# Patient Record
Sex: MALE | Race: WHITE | NOT HISPANIC OR LATINO | Employment: OTHER | URBAN - METROPOLITAN AREA
[De-identification: names, ages, dates, MRNs, and addresses within clinical notes are randomized per-mention and may not be internally consistent; named-entity substitution may affect disease eponyms.]

---

## 2017-03-29 ENCOUNTER — OFFICE VISIT (OUTPATIENT)
Dept: FAMILY MEDICINE | Facility: CLINIC | Age: 72
End: 2017-03-29
Payer: MEDICARE

## 2017-03-29 ENCOUNTER — RADIANT APPOINTMENT (OUTPATIENT)
Dept: GENERAL RADIOLOGY | Facility: CLINIC | Age: 72
End: 2017-03-29
Attending: FAMILY MEDICINE
Payer: MEDICARE

## 2017-03-29 VITALS
HEART RATE: 66 BPM | TEMPERATURE: 98.6 F | BODY MASS INDEX: 35.35 KG/M2 | WEIGHT: 261 LBS | DIASTOLIC BLOOD PRESSURE: 79 MMHG | HEIGHT: 72 IN | SYSTOLIC BLOOD PRESSURE: 135 MMHG | OXYGEN SATURATION: 96 %

## 2017-03-29 DIAGNOSIS — S86.911A STRAIN OF RIGHT KNEE, INITIAL ENCOUNTER: Primary | ICD-10-CM

## 2017-03-29 DIAGNOSIS — S86.911A STRAIN OF RIGHT KNEE, INITIAL ENCOUNTER: ICD-10-CM

## 2017-03-29 PROCEDURE — 99213 OFFICE O/P EST LOW 20 MIN: CPT | Performed by: FAMILY MEDICINE

## 2017-03-29 PROCEDURE — 73560 X-RAY EXAM OF KNEE 1 OR 2: CPT | Mod: RT

## 2017-03-29 NOTE — MR AVS SNAPSHOT
After Visit Summary   3/29/2017    Mamadou Pena    MRN: [id number]           Patient Information     Date Of Birth          1945        Visit Information        Provider Department      3/29/2017 10:45 AM Chay Rudd MD [facility]        Today's Diagnoses     Strain of right knee, initial encounter    -  1       Follow-ups after your visit        Who to contact     If you have questions or need follow up information about today's clinic visit or your schedule please contact [facility] directly at [phone].  Normal or non-critical lab and imaging results will be communicated to you by MyChart, letter or phone within 4 business days after the clinic has received the results. If you do not hear from us within 7 days, please contact the clinic through Ad Summost or phone. If you have a critical or abnormal lab result, we will notify you by phone as soon as possible.  Submit refill requests through AeternusLED or call your pharmacy and they will forward the refill request to us. Please allow 3 business days for your refill to be completed.          Additional Information About Your Visit        MyChart Information     AeternusLED gives you secure access to your electronic health record. If you see a primary care provider, you can also send messages to your care team and make appointments. If you have questions, please call your primary care clinic.  If you do not have a primary care provider, please call [phone] and they will assist you.        Care EveryWhere ID     This is your Care EveryWhere ID. This could be used by other organizations to access your [facility] medical records  [id number]        Your Vitals Were     Pulse Temperature Height Pulse Oximetry BMI (Body Mass Index)       66 98.6  F (37  C) (Oral) 6' (1.829 m) 96% 35.4 kg/m2        Blood Pressure from Last 3 Encounters:   03/29/17 135/79   11/22/16 128/72   01/20/16 138/70    Weight from Last 3  Encounters:   03/29/17 261 lb (118.4 kg)   11/22/16 253 lb 12.8 oz (115.1 kg)   01/20/16 262 lb (118.8 kg)               Primary Care Provider Office Phone # Fax #    Chay Rudd [phone]       [facility] [address] [address] [facility] [address]        Thank you!     Thank you for choosing [facility]  for your care. Our goal is always to provide you with excellent care. Hearing back from our patients is one way we can continue to improve our services. Please take a few minutes to complete the written survey that you may receive in the mail after your visit with us. Thank you!             Your Updated Medication List - Protect others around you: Learn how to safely use, store and throw away your medicines at www.disposemymeds.org.          This list is accurate as of: 3/29/17  1:03 PM.  Always use your most recent med list.                   Brand Name Dispense Instructions for use    acyclovir 400 MG tablet    ZOVIRAX    90 tablet    Take 1 tablet (400 mg) by mouth daily       amLODIPine 10 MG tablet    NORVASC    90 tablet    Take 1 tablet (10 mg) by mouth daily       aspirin 325 MG EC tablet      1 TABLET DAILY       atenolol 50 MG tablet    TENORMIN    90 tablet    Take 1 tablet (50 mg) by mouth daily       CENTRUM SILVER per tablet      Take 1 tablet by mouth daily       diclofenac 1 % Gel topical gel    VOLTAREN    100 g    Apply 4 grams to knees or 2 grams to hands four times daily using enclosed dosing card.       fluticasone 50 MCG/ACT spray    FLONASE    16 g    Spray 1-2 sprays into both nostrils daily       levothyroxine 150 MCG tablet    LEVOXYL    90 tablet    Take 1 tablet (150 mcg) by mouth daily       omeprazole 20 MG CR capsule    priLOSEC    90 capsule    Take 1 capsule (20 mg) by mouth daily       * order for DME     1 Units    Needs CPAP supplies  He continue to use and benefit from the CPAP 780.57C Sleep apnea       * order for DME     1  Units    Equipment being ordered: CPAP mask and supplies       order for DME     1 Units    Equipment being ordered: CPAP mask and supplies       * Notice:  This list has 2 medication(s) that are the same as other medications prescribed for you. Read the directions carefully, and ask your doctor or other care provider to review them with you.

## 2017-03-29 NOTE — NURSING NOTE
Chief Complaint   Patient presents with     Knee Pain     fell on R knee and hurt it a few days ago. Stiffness, and some pain. Hard time going up and down stairs. Took left over Oxycodone to help sleep at night. Voltaren did not help. Heat and soaking in hot tub once daily       Initial /79  Pulse 66  Temp 98.6  F (37  C) (Oral)  Ht 6' (1.829 m)  Wt 261 lb (118.4 kg)  SpO2 96%  BMI 35.4 kg/m2 Estimated body mass index is 35.4 kg/(m^2) as calculated from the following:    Height as of this encounter: 6' (1.829 m).    Weight as of this encounter: 261 lb (118.4 kg).  Medication Reconciliation: complete    Princess Valiente MA

## 2017-03-29 NOTE — PROGRESS NOTES
SUBJECTIVE:  Mamadou Pena is a 71 year old male who presents to the clinic today for right knee pain.  Onset of symptoms: 9 months ago.  History of injury: fell nine months ago and hurt one month but reinjured the knee with a twisting motion.  Associated symptoms: giving out  Location of pain: anterior  Symptoms are: Improving  :    Medications and therapies tried: rest helped  What makes it worse: going down stairs    Past Medical, social, family histories, medications, and allergies reviewed and updated    OBJECTIVE:  Blood pressure 135/79, pulse 66, temperature 98.6  F (37  C), temperature source Oral, height 6' (1.829 m), weight 261 lb (118.4 kg), SpO2 96 %.  Patient appears to be in alert and in no apparent distress distress  KNEE EXAM (right)  Effusion: yes  Erythema: no  Patellar tenderness: no  Medial joint line tenderness: yes  Lateral joint line tenderness: no  Lachman's test: negative  Bret's maneuver: positive  Valgus:negative  Varus:negative  range of motion: full  Calves soft non-tender.  Neurovascularly Intact Distally.      ICD-10-CM    1. Strain of right knee, initial encounter S86.911A XR Knee Right 1/2 Views    PLAN:consider ortho referral if not continuing to iprove over the next month

## 2017-08-05 DIAGNOSIS — I10 HYPERTENSION GOAL BP (BLOOD PRESSURE) < 140/90: ICD-10-CM

## 2017-08-07 RX ORDER — AMLODIPINE BESYLATE 10 MG/1
TABLET ORAL
Qty: 90 TABLET | Refills: 0 | Status: SHIPPED | OUTPATIENT
Start: 2017-08-07 | End: 2017-11-13

## 2017-11-13 ENCOUNTER — TELEPHONE (OUTPATIENT)
Dept: FAMILY MEDICINE | Facility: CLINIC | Age: 72
End: 2017-11-13

## 2017-11-13 DIAGNOSIS — I10 HYPERTENSION GOAL BP (BLOOD PRESSURE) < 140/90: ICD-10-CM

## 2017-11-13 DIAGNOSIS — E78.00 HIGH CHOLESTEROL: ICD-10-CM

## 2017-11-13 DIAGNOSIS — E03.9 HYPOTHYROIDISM, UNSPECIFIED TYPE: Primary | ICD-10-CM

## 2017-11-14 ENCOUNTER — DOCUMENTATION ONLY (OUTPATIENT)
Dept: LAB | Facility: CLINIC | Age: 72
End: 2017-11-14

## 2017-11-14 DIAGNOSIS — E03.9 HYPOTHYROIDISM, UNSPECIFIED TYPE: ICD-10-CM

## 2017-11-14 DIAGNOSIS — Z13.6 CARDIOVASCULAR SCREENING; LDL GOAL LESS THAN 130: Primary | ICD-10-CM

## 2017-11-14 DIAGNOSIS — I10 HYPERTENSION GOAL BP (BLOOD PRESSURE) < 140/90: ICD-10-CM

## 2017-11-14 RX ORDER — AMLODIPINE BESYLATE 10 MG/1
TABLET ORAL
Qty: 30 TABLET | Refills: 0 | Status: SHIPPED | OUTPATIENT
Start: 2017-11-14 | End: 2017-12-14

## 2017-11-14 NOTE — TELEPHONE ENCOUNTER
Patient has PVL and appointment scheduled with Dr. Chay Rudd for end of month.   Please sign lab orders.     :;  Please pend previsit lab orders and send to Dr. Chay Rudd for signature.  Lab appointment scheduled for 11/16.  Adriana Hightower RN    Health Maintenance Due   Topic Date Due     MEDICARE ANNUAL WELLNESS VISIT  12/02/1963     PNEUMOCOCCAL (1 of 2 - PCV13) 12/02/2010     AORTIC ANEURYSM SCREENING (SYSTEM ASSIGNED)  12/02/2010     ADVANCE DIRECTIVE PLANNING Q5 YRS  09/08/2016     TSH Q1 YEAR  01/15/2017     INFLUENZA VACCINE (SYSTEM ASSIGNED)  09/01/2017     FALL RISK ASSESSMENT  11/22/2017

## 2017-11-14 NOTE — PROGRESS NOTES
Need previsit labs-See pending orders and close encounter./Brenda Carranza,         Physical 11/27/17

## 2017-11-16 DIAGNOSIS — E78.00 HIGH CHOLESTEROL: ICD-10-CM

## 2017-11-16 DIAGNOSIS — E03.9 HYPOTHYROIDISM, UNSPECIFIED TYPE: ICD-10-CM

## 2017-11-16 DIAGNOSIS — I10 HYPERTENSION GOAL BP (BLOOD PRESSURE) < 140/90: ICD-10-CM

## 2017-11-16 LAB
ANION GAP SERPL CALCULATED.3IONS-SCNC: 6 MMOL/L (ref 3–14)
BUN SERPL-MCNC: 18 MG/DL (ref 7–30)
CALCIUM SERPL-MCNC: 9.1 MG/DL (ref 8.5–10.1)
CHLORIDE SERPL-SCNC: 105 MMOL/L (ref 94–109)
CHOLEST SERPL-MCNC: 210 MG/DL
CO2 SERPL-SCNC: 29 MMOL/L (ref 20–32)
CREAT SERPL-MCNC: 1.18 MG/DL (ref 0.66–1.25)
GFR SERPL CREATININE-BSD FRML MDRD: 61 ML/MIN/1.7M2
GLUCOSE SERPL-MCNC: 110 MG/DL (ref 70–99)
HDLC SERPL-MCNC: 34 MG/DL
LDLC SERPL CALC-MCNC: 123 MG/DL
NONHDLC SERPL-MCNC: 176 MG/DL
POTASSIUM SERPL-SCNC: 4.7 MMOL/L (ref 3.4–5.3)
SODIUM SERPL-SCNC: 140 MMOL/L (ref 133–144)
TRIGL SERPL-MCNC: 266 MG/DL
TSH SERPL DL<=0.005 MIU/L-ACNC: 2.22 MU/L (ref 0.4–4)

## 2017-11-16 PROCEDURE — 84443 ASSAY THYROID STIM HORMONE: CPT | Performed by: FAMILY MEDICINE

## 2017-11-16 PROCEDURE — 80061 LIPID PANEL: CPT | Performed by: FAMILY MEDICINE

## 2017-11-16 PROCEDURE — 80048 BASIC METABOLIC PNL TOTAL CA: CPT | Performed by: FAMILY MEDICINE

## 2017-11-16 PROCEDURE — 36415 COLL VENOUS BLD VENIPUNCTURE: CPT | Performed by: FAMILY MEDICINE

## 2017-11-30 ENCOUNTER — TELEPHONE (OUTPATIENT)
Dept: FAMILY MEDICINE | Facility: CLINIC | Age: 72
End: 2017-11-30

## 2017-11-30 NOTE — TELEPHONE ENCOUNTER
Patient came in for their physical thinking that it was today 11/30/17 when it was actually 11/27/17. They said that the person who scheduled the appt told them 11/30. They would like to speak to pcp or team to see if he can get in sooner for appt. And to know if they will need to redo labs. We did schedule an appt for him on 12/14

## 2017-11-30 NOTE — TELEPHONE ENCOUNTER
Dr. Rudd has no openings today and is already double booked, he is out all next week as well. The soonest would be 12/14/17 which he is already scheduled.    LM for patient letting him know we are not able to fit him in, soonest would be 12/14/17.    Brenda Carranza,

## 2017-12-14 ENCOUNTER — OFFICE VISIT (OUTPATIENT)
Dept: FAMILY MEDICINE | Facility: CLINIC | Age: 72
End: 2017-12-14
Payer: MEDICARE

## 2017-12-14 VITALS
DIASTOLIC BLOOD PRESSURE: 84 MMHG | BODY MASS INDEX: 34.26 KG/M2 | HEART RATE: 67 BPM | OXYGEN SATURATION: 98 % | SYSTOLIC BLOOD PRESSURE: 149 MMHG | TEMPERATURE: 98.5 F | WEIGHT: 252.6 LBS

## 2017-12-14 DIAGNOSIS — R09.81 NASAL CONGESTION: ICD-10-CM

## 2017-12-14 DIAGNOSIS — Z23 NEED FOR VACCINATION: ICD-10-CM

## 2017-12-14 DIAGNOSIS — I10 ESSENTIAL HYPERTENSION: ICD-10-CM

## 2017-12-14 DIAGNOSIS — Z00.00 MEDICARE ANNUAL WELLNESS VISIT, SUBSEQUENT: Primary | ICD-10-CM

## 2017-12-14 DIAGNOSIS — M19.041 PRIMARY OSTEOARTHRITIS OF RIGHT HAND: ICD-10-CM

## 2017-12-14 DIAGNOSIS — K21.00 GASTROESOPHAGEAL REFLUX DISEASE WITH ESOPHAGITIS: ICD-10-CM

## 2017-12-14 DIAGNOSIS — B00.9 HERPES SIMPLEX TYPE 2 INFECTION: ICD-10-CM

## 2017-12-14 DIAGNOSIS — I10 HYPERTENSION GOAL BP (BLOOD PRESSURE) < 140/90: ICD-10-CM

## 2017-12-14 DIAGNOSIS — E03.9 HYPOTHYROIDISM, UNSPECIFIED TYPE: ICD-10-CM

## 2017-12-14 PROCEDURE — 90732 PPSV23 VACC 2 YRS+ SUBQ/IM: CPT | Performed by: FAMILY MEDICINE

## 2017-12-14 PROCEDURE — G0009 ADMIN PNEUMOCOCCAL VACCINE: HCPCS | Performed by: FAMILY MEDICINE

## 2017-12-14 PROCEDURE — G0439 PPPS, SUBSEQ VISIT: HCPCS | Performed by: FAMILY MEDICINE

## 2017-12-14 RX ORDER — LEVOTHYROXINE SODIUM 150 UG/1
150 TABLET ORAL DAILY
Qty: 90 TABLET | Refills: 3 | Status: SHIPPED | OUTPATIENT
Start: 2017-12-14 | End: 2018-06-20

## 2017-12-14 RX ORDER — AMLODIPINE BESYLATE 10 MG/1
TABLET ORAL
Qty: 90 TABLET | Refills: 1 | Status: SHIPPED | OUTPATIENT
Start: 2017-12-14 | End: 2018-06-07

## 2017-12-14 RX ORDER — FLUTICASONE PROPIONATE 50 MCG
1-2 SPRAY, SUSPENSION (ML) NASAL DAILY
Qty: 16 G | Refills: 5 | Status: CANCELLED | OUTPATIENT
Start: 2017-12-14

## 2017-12-14 RX ORDER — ATENOLOL 50 MG/1
50 TABLET ORAL DAILY
Qty: 90 TABLET | Refills: 1 | Status: SHIPPED | OUTPATIENT
Start: 2017-12-14 | End: 2018-06-07

## 2017-12-14 RX ORDER — ACYCLOVIR 400 MG/1
400 TABLET ORAL DAILY
Qty: 90 TABLET | Refills: 3 | Status: SHIPPED | OUTPATIENT
Start: 2017-12-14 | End: 2019-07-16

## 2017-12-14 ASSESSMENT — ACTIVITIES OF DAILY LIVING (ADL)
I_NEED_ASSISTANCE_FOR_THE_FOLLOWING_DAILY_ACTIVITIES:: NO ASSISTANCE IS NEEDED
CURRENT_FUNCTION: NO ASSISTANCE NEEDED

## 2017-12-14 NOTE — NURSING NOTE
Prior to injection verified patient identity using patient's name and date of birth.    Patient instructed to wait in clinic for 20 minutes following injection and to notify staff of any adverse reactions immediately.     Screening Questionnaire for Adult Immunization     Are you sick today?   No   Do you have allergies to medications, food or any vaccine?   No   Have you ever had a serious reaction after receiving a vaccination?   No   Do you have a long-term health problem with heart disease, lung disease,  asthma, kidney disease, diabetes, anemia, metabolic or blood disease?   No   Do you have cancer, leukemia, AIDS, or any immune system problem?   No   Do you take cortisone, prednisone, other steroids, or anticancer drugs, or  have you had any x-ray (radiation) treatments?   No   Have you had a seizure, brain, or other nervous system problem?   No   During the past year, have you received a transfusion of blood or blood       products, or been given a medicine called immune (gamma) globulin?   No   For women: Are you pregnant or is there a chance you could become         pregnant during the next month?   No   Have you received any vaccinations in the past 4 weeks?   No    Immunization questionnaire answers were all negative.      MNVFC doesn't apply on this patient  Sukumar Zavala, [facility]

## 2017-12-14 NOTE — MR AVS SNAPSHOT
After Visit Summary   12/14/2017    Mamadou Pena    MRN: [id number]           Patient Information     Date Of Birth          1945        Visit Information        Provider Department      12/14/2017 10:15 AM Chay Rudd MD [facility]        Today's Diagnoses     Medicare annual wellness visit, subsequent    -  1    Hypertension goal BP (blood pressure) < 140/90        Herpes simplex type 2 infection        Essential hypertension        Nasal congestion        Hypothyroidism, unspecified type        Gastroesophageal reflux disease with esophagitis        Primary osteoarthritis of right hand        Need for vaccination          Care Instructions      Preventive Health Recommendations:       Male Ages 65 and over    Yearly exam:             See your health care provider every year in order to  o   Review health changes.   o   Discuss preventive care.    o   Review your medicines if your doctor has prescribed any.    Talk with your health care provider about whether you should have a test to screen for prostate cancer (PSA).    Every 3 years, have a diabetes test (fasting glucose). If you are at risk for diabetes, you should have this test more often.    Every 5 years, have a cholesterol test. Have this test more often if you are at risk for high cholesterol or heart disease.     Every 10 years, have a colonoscopy. Or, have a yearly FIT test (stool test). These exams will check for colon cancer.    Talk to with your health care provider about screening for Abdominal Aortic Aneurysm if you have a family history of AAA or have a history of smoking.  Shots:     Get a flu shot each year.     Get a tetanus shot every 10 years.     Talk to your doctor about your pneumonia vaccines. There are now two you should receive - Pneumovax (PPSV 23) and Prevnar (PCV 13).    Talk to your doctor about a shingles vaccine.     Talk to your doctor about the hepatitis B vaccine.  Nutrition:      Eat at least 5 servings of fruits and vegetables each day.     Eat whole-grain bread, whole-wheat pasta and brown rice instead of white grains and rice.     Talk to your doctor about Calcium and Vitamin D.   Lifestyle    Exercise for at least 150 minutes a week (30 minutes a day, 5 days a week). This will help you control your weight and prevent disease.     Limit alcohol to one drink per day.     No smoking.     Wear sunscreen to prevent skin cancer.     See your dentist every six months for an exam and cleaning.     See your eye doctor every 1 to 2 years to screen for conditions such as glaucoma, macular degeneration and cataracts.          Follow-ups after your visit        Future tests that were ordered for you today     Open Future Orders        Priority Expected Expires Ordered    US abdominal aorta limited Routine  12/14/2018 12/14/2017            Who to contact     If you have questions or need follow up information about today's clinic visit or your schedule please contact [facility] directly at [phone].  Normal or non-critical lab and imaging results will be communicated to you by Ozmo Deviceshart, letter or phone within 4 business days after the clinic has received the results. If you do not hear from us within 7 days, please contact the clinic through PlaceFirstt or phone. If you have a critical or abnormal lab result, we will notify you by phone as soon as possible.  Submit refill requests through Metaresolver or call your pharmacy and they will forward the refill request to us. Please allow 3 business days for your refill to be completed.          Additional Information About Your Visit        Ozmo DevicesharPeople and Pages Information     Metaresolver gives you secure access to your electronic health record. If you see a primary care provider, you can also send messages to your care team and make appointments. If you have questions, please call your primary care clinic.  If you do not have a primary care provider, please  call [phone] and they will assist you.        Care EveryWhere ID     This is your Care EveryWhere ID. This could be used by other organizations to access your [facility] medical records  [id number]        Your Vitals Were     Pulse Temperature Pulse Oximetry BMI (Body Mass Index)          67 98.5  F (36.9  C) (Oral) 98% 34.26 kg/m2         Blood Pressure from Last 3 Encounters:   12/14/17 149/84   03/29/17 135/79   11/22/16 128/72    Weight from Last 3 Encounters:   12/14/17 252 lb 9.6 oz (114.6 kg)   03/29/17 261 lb (118.4 kg)   11/22/16 253 lb 12.8 oz (115.1 kg)              We Performed the Following     ADMIN PNEUMOCOCCAL VACCINE (For MEDICARE Patients ONLY) []     Pneumococcal vaccine 23 valent PPSV23  (Pneumovax) [68249]          Today's Medication Changes          These changes are accurate as of: 12/14/17  2:39 PM.  If you have any questions, ask your nurse or doctor.               These medicines have changed or have updated prescriptions.        Dose/Directions    amLODIPine 10 MG tablet   Commonly known as:  NORVASC   This may have changed:  See the new instructions.   Used for:  Hypertension goal BP (blood pressure) < 140/90   Changed by:  Chay Rudd MD        TAKE 1 TABLET(10 MG) BY MOUTH DAILY   Quantity:  90 tablet   Refills:  1            Where to get your medicines      [facility] [place] [address] [place] AT [address] [place], [place] [address]     Phone:  [phone]     acyclovir 400 MG tablet    amLODIPine 10 MG tablet    atenolol 50 MG tablet    diclofenac 1 % Gel topical gel    levothyroxine 150 MCG tablet    omeprazole 20 MG CR capsule                Primary Care Provider Office Phone # Fax #    Chay Rudd [phone] [address]  [facility] [address]        Equal Access to Services     JEVON PERDOMO AH: María dewey  Channing, kiritda luqadaha, qaybta kaalcarmenza hare, yadira mcguire torstenyoselin brandojuliana laUdaymaurice yue. So [facility] [phone].    ATENCIÓN: Si oz odell, tiene a french disposición servicios gratuitos de asistencia lingüística. Loraine al [phone].    We comply with applicable federal civil rights laws and [place] laws. We do not discriminate on the basis of race, color, national origin, age, disability, sex, sexual orientation, or gender identity.            Thank you!     Thank you for choosing [facility]  for your care. Our goal is always to provide you with excellent care. Hearing back from our patients is one way we can continue to improve our services. Please take a few minutes to complete the written survey that you may receive in the mail after your visit with us. Thank you!             Your Updated Medication List - Protect others around you: Learn how to safely use, store and throw away your medicines at www.disposemymeds.org.          This list is accurate as of: 12/14/17  2:39 PM.  Always use your most recent med list.                   Brand Name Dispense Instructions for use Diagnosis    acyclovir 400 MG tablet    ZOVIRAX    90 tablet    Take 1 tablet (400 mg) by mouth daily    Herpes simplex type 2 infection       amLODIPine 10 MG tablet    NORVASC    90 tablet    TAKE 1 TABLET(10 MG) BY MOUTH DAILY    Hypertension goal BP (blood pressure) < 140/90       aspirin 325 MG EC tablet      1 TABLET DAILY        atenolol 50 MG tablet    TENORMIN    90 tablet    Take 1 tablet (50 mg) by mouth daily    Essential hypertension       CENTRUM SILVER per tablet      Take 1 tablet by mouth daily        diclofenac 1 % Gel topical gel    VOLTAREN    100 g    Apply 4 grams to knees or 2 grams to hands four times daily using enclosed dosing card.    Primary osteoarthritis of right hand       fluticasone 50 MCG/ACT spray    FLONASE    16 g    Spray 1-2 sprays into both nostrils daily    Nasal congestion        levothyroxine 150 MCG tablet    LEVOXYL    90 tablet    Take 1 tablet (150 mcg) by mouth daily    Hypothyroidism, unspecified type       omeprazole 20 MG CR capsule    priLOSEC    90 capsule    Take 1 capsule (20 mg) by mouth daily    Gastroesophageal reflux disease with esophagitis       * order for DME     1 Units    Needs CPAP supplies  He continue to use and benefit from the CPAP 780.57C Sleep apnea    WILBERTO (obstructive sleep apnea)       * order for DME     1 Units    Equipment being ordered: CPAP mask and supplies    WILBERTO (obstructive sleep apnea)       order for DME     1 Units    Equipment being ordered: CPAP mask and supplies    WILBERTO (obstructive sleep apnea)       * Notice:  This list has 2 medication(s) that are the same as other medications prescribed for you. Read the directions carefully, and ask your doctor or other care provider to review them with you.

## 2017-12-14 NOTE — NURSING NOTE
Chief Complaint   Patient presents with     Wellness Visit       Initial /84  Pulse 67  Temp 98.5  F (36.9  C) (Oral)  Wt 252 lb 9.6 oz (114.6 kg)  SpO2 98%  BMI 34.26 kg/m2 Estimated body mass index is 34.26 kg/(m^2) as calculated from the following:    Height as of 3/29/17: 6' (1.829 m).    Weight as of this encounter: 252 lb 9.6 oz (114.6 kg).  Medication Reconciliation: complete  Sukumar Zavala, [facility]OR[facility]

## 2017-12-14 NOTE — PROGRESS NOTES
"  SUBJECTIVE:   Mamadou Pena is a 72 year old male who presents for Preventive Visit.  {PVP to remind patient that this is not necessarily a physical exam; physical exam may or may not be done:911896::\"click delete button to remove this line now\"}  {PVP to inform patient that additional E&M charge may apply, if additional problems addressed:497582::\"click delete button to remove this line now\"}  Are you in the first 12 months of your Medicare Part B coverage?  {No Yes:103422::\"No\"}    Healthy Habits:    Do you get at least three servings of calcium containing foods daily (dairy, green leafy vegetables, etc.)? {YES/NO, DAIRY INTAKE:444159::\"yes\"}    Amount of exercise or daily activities, outside of work: {AMOUNT EXERCISE:345789}    Problems taking medications regularly {Yes /No default:827203::\"No\"}    Medication side effects: {Yes /No default.:963339::\"No\"}    Have you had an eye exam in the past two years? {YESNOBLANK:564341}    Do you see a dentist twice per year? {YESNOBLANK:207041}    Do you have sleep apnea, excessive snoring or daytime drowsiness?{YESNOBLANK:125039}      Ability to successfully perform activities of daily living: {YES/NO (MEDICARE):806993::\"Yes, no assistance needed\"}    Home safety:  {IPPE SAFETY CONCERNS:177595::\"none identified\"}     Hearing impairment: {NO/YES:542182}    Fall risk:  {Document Fall Risk in the Assessments Section of the Navigator:694235}    {If any of the above assessments are answered yes, consider ordering appropriate referrals (Optional):757561::\"click delete button to remove this line now\"}    {AWV Cognitive Screenin}    {Outside tests to abstract? :906913}    {additional problems to add (Optional):203841}    Reviewed and updated as needed this visit by clinical staff         Reviewed and updated as needed this visit by Provider        Social History   Substance Use Topics     Smoking status: Former Smoker     Quit date: 1971     Smokeless tobacco: " Never Used     Alcohol use 1.0 - 1.5 oz/week     2 - 3 Standard drinks or equivalent per week       If you drink alcohol do you typically have >3 drinks per day or >7 drinks per week? {ETOH :916372}                        Today's PHQ-2 Score:   PHQ-2 (  [employer]) 2016   Q1: Little interest or pleasure in doing things 0 0   Q2: Feeling down, depressed or hopeless 0 0   PHQ-2 Score 0 0     {PHQ-2 LOOK IN ASSESSMENTS (Optional) :854513}  Do you feel safe in your environment - {YES/NO/NA:599729}    Do you have a Health Care Directive?: {HEALTHCARE DIRECTIVE STATUS:929475}    Current providers sharing in care for this patient include:   Patient Care Team:  Chay Rudd MD as PCP - General    The following health maintenance items are reviewed in Epic and correct as of today:  Health Maintenance   Topic Date Due     MEDICARE ANNUAL WELLNESS VISIT  1963     PNEUMOCOCCAL (1 of 2 - PCV13) 2010     AORTIC ANEURYSM SCREENING (SYSTEM ASSIGNED)  2010     ADVANCE DIRECTIVE PLANNING Q5 YRS  2016     INFLUENZA VACCINE (SYSTEM ASSIGNED)  2017     FALL RISK ASSESSMENT  2017     TSH Q1 YEAR  2018     COLON CANCER SCREEN (SYSTEM ASSIGNED)  11/15/2020     LIPID SCREEN Q5 YR MALE (SYSTEM ASSIGNED)  2022     TETANUS IMMUNIZATION (SYSTEM ASSIGNED)  2023     HEPATITIS C SCREENING  Completed     {Chronicprobdata (Optional):422319}    {Decision Support (Optional):324704}    ROS:  {ROS COMP:526823}    OBJECTIVE:   There were no vitals taken for this visit. Estimated body mass index is 35.4 kg/(m^2) as calculated from the following:    Height as of 3/29/17: 6' (1.829 m).    Weight as of 3/29/17: 261 lb (118.4 kg).  EXAM:   {Exam :698707}    ASSESSMENT / PLAN:   {Diag Picklist:613776}    End of Life Planning:  Patient currently has an advanced directive: { :246007}    COUNSELING:  {Medicare Counselin}    {BP Counseling- Complete if BP >= 120/80   (Optional):083025}    Estimated body mass index is 35.4 kg/(m^2) as calculated from the following:    Height as of 3/29/17: 6' (1.829 m).    Weight as of 3/29/17: 261 lb (118.4 kg).  {Weight Management Plan -- Complete if patient has an abnormal BMI (Optional):495743}     reports that he quit smoking about 46 years ago. He has never used smokeless tobacco.  {Tobacco Cessation -- Complete if patient is a smoker (Optional):075203}    Appropriate preventive services were discussed with this patient, including applicable screening as appropriate for cardiovascular disease, diabetes, osteopenia/osteoporosis, and glaucoma.  As appropriate for age/gender, discussed screening for colorectal cancer, prostate cancer, breast cancer, and cervical cancer. Checklist reviewing preventive services available has been given to the patient.    Reviewed patients plan of care and provided an AVS. The {CarePlan:710579} for Kumar meets the Care Plan requirement. This Care Plan has been established and reviewed with the {PATIENT, FAMILY MEMBER, CAREGIVER:618527}.    Counseling Resources:  ATP IV Guidelines  Pooled Cohorts Equation Calculator  Breast Cancer Risk Calculator  FRAX Risk Assessment  ICSI Preventive Guidelines  Dietary Guidelines for Americans, 2010  [email or web address]'s MyPlate  ASA Prophylaxis  Lung CA Screening    Chay Rudd MD  [facility]

## 2017-12-14 NOTE — PROGRESS NOTES
SUBJECTIVE:   Mamadou Pena is a 72 year old male who presents for Preventive Visit.      Are you in the first 12 months of your Medicare coverage?  No    Physical   Annual:     Getting at least 3 servings of Calcium per day::  Yes    Bi-annual eye exam::  Yes    Dental care twice a year::  Yes    Sleep apnea or symptoms of sleep apnea::  Sleep apnea    Diet::  Regular (no restrictions)    Frequency of exercise::  2-3 days/week    Taking medications regularly::  Yes    Medication side effects::  None    Additional concerns today::  YES    Ability to successfully perform activities of daily living: no assistance needed  Home Safety:  Lack of grab bars in the bathroom  Hearing Impairment: difficulty following a conversation in a noisy restaurant or crowded room, feel that people are mumbling or not speaking clearly, difficulty following dialogue in the theater, difficult to understand a speaker at a public meeting or Sabianist service, need to ask people to speak up or repeat themselves, find that men's voices are easier to understand than woman's and difficulty understanding soft or whispered speech    Has hearing aids  Fall risk:  Fallen 2 or more times in the past year?: No  Any fall with injury in the past year?: Yes    click delete button to remove this line now  COGNITIVE SCREEN  1) Repeat 3 items (Banana, Sunrise, Chair)  2) Clock drawNORMAL  3) 3 item recall:Recalls 3 objects  Results: NORMAL clock, 1-2 items recalled: COGNITIVE IMPAIRMENT LESS LIKELY    Mini-CogTM Copyright S Allie. Licensed by the author for use in [facility]; reprinted with permission (belkys@.[email or web address]). All rights reserved.          Reviewed and updated as needed this visit by clinical staffTobacco  Allergies  Meds  Med Hx  Surg Hx  Fam Hx  Soc Hx        Reviewed and updated as needed this visit by Provider        Social History   Substance Use Topics     Smoking status: Former Smoker     Quit date: 2/8/1971      Smokeless tobacco: Never Used     Alcohol use 1.0 - 1.5 oz/week     2 - 3 Standard drinks or equivalent per week       Alcohol Use 12/14/2017   If you drink alcohol, do you typically have greater than 3 drinks per day OR greater than 7 drinks per week?   No   No flowsheet data found.            SUBJECTIVE:  72 year oldyear old male enters with a hx of hypertension.  Pt. Has been compliant with medications and medications were reviewed.  No side effects. No chest pain or sob. Low sodium diet.    Current Outpatient Prescriptions:      amLODIPine (NORVASC) 10 MG tablet, TAKE 1 TABLET(10 MG) BY MOUTH DAILY, Disp: 90 tablet, Rfl: 1     acyclovir (ZOVIRAX) 400 MG tablet, Take 1 tablet (400 mg) by mouth daily, Disp: 90 tablet, Rfl: 3     atenolol (TENORMIN) 50 MG tablet, Take 1 tablet (50 mg) by mouth daily, Disp: 90 tablet, Rfl: 1     levothyroxine (LEVOXYL) 150 MCG tablet, Take 1 tablet (150 mcg) by mouth daily, Disp: 90 tablet, Rfl: 3     omeprazole (PRILOSEC) 20 MG CR capsule, Take 1 capsule (20 mg) by mouth daily, Disp: 90 capsule, Rfl: 3     diclofenac (VOLTAREN) 1 % GEL topical gel, Apply 4 grams to knees or 2 grams to hands four times daily using enclosed dosing card., Disp: 100 g, Rfl: 1     fluticasone (FLONASE) 50 MCG/ACT nasal spray, Spray 1-2 sprays into both nostrils daily, Disp: 16 g, Rfl: 5     Multiple Vitamins-Minerals (CENTRUM SILVER) per tablet, Take 1 tablet by mouth daily, Disp: , Rfl:      order for DME, Needs CPAP supplies  He continue to use and benefit from the CPAP 780.57C Sleep apnea, Disp: 1 Units, Rfl: 1     order for DME, Equipment being ordered: CPAP mask and supplies, Disp: 1 Units, Rfl: 1     order for DME, Equipment being ordered: CPAP mask and supplies, Disp: 1 Units, Rfl: 0     ASPIRIN 325 MG OR TBEC, 1 TABLET DAILY, Disp: , Rfl:      [DISCONTINUED] amLODIPine (NORVASC) 10 MG tablet, TAKE 1 TABLET(10 MG) BY MOUTH DAILY, Disp: 30 tablet, Rfl: 0     [DISCONTINUED] acyclovir (ZOVIRAX) 400  MG tablet, Take 1 tablet (400 mg) by mouth daily, Disp: 90 tablet, Rfl: 3     [DISCONTINUED] atenolol (TENORMIN) 50 MG tablet, Take 1 tablet (50 mg) by mouth daily, Disp: 90 tablet, Rfl: 1     [DISCONTINUED] levothyroxine (LEVOXYL) 150 MCG tablet, Take 1 tablet (150 mcg) by mouth daily, Disp: 90 tablet, Rfl: 3  Past Medical History:   Diagnosis Date     Allergic rhinitis      ED (erectile dysfunction)      GERD (gastroesophageal reflux disease)      Hypothyroid      Sleep apnea      Unspecified essential hypertension      Orders Only on 11/16/2017   Component Date Value Ref Range Status     TSH 11/16/2017 2.22  0.40 - 4.00 mU/L Final     Sodium 11/16/2017 140  133 - 144 mmol/L Final     Potassium 11/16/2017 4.7  3.4 - 5.3 mmol/L Final     Chloride 11/16/2017 105  94 - 109 mmol/L Final     Carbon Dioxide 11/16/2017 29  20 - 32 mmol/L Final     Anion Gap 11/16/2017 6  3 - 14 mmol/L Final     Glucose 11/16/2017 110* 70 - 99 mg/dL Final    Fasting specimen     Urea Nitrogen 11/16/2017 18  7 - 30 mg/dL Final     Creatinine 11/16/2017 1.18  0.66 - 1.25 mg/dL Final     GFR Estimate 11/16/2017 61  >60 mL/min/1.7m2 Final    Non  GFR Calc     GFR Estimate If Black 11/16/2017 73  >60 mL/min/1.7m2 Final    African American GFR Calc     Calcium 11/16/2017 9.1  8.5 - 10.1 mg/dL Final     Cholesterol 11/16/2017 210* <200 mg/dL Final    Desirable:       <200 mg/dl     Triglycerides 11/16/2017 266* <150 mg/dL Final    Comment: Borderline high:  150-199 mg/dl  High:             200-499 mg/dl  Very high:       >499 mg/dl  Fasting specimen       HDL Cholesterol 11/16/2017 34* >39 mg/dL Final     LDL Cholesterol Calculated 11/16/2017 123* <100 mg/dL Final    Comment: Above desirable:  100-129 mg/dl  Borderline High:  130-159 mg/dL  High:             160-189 mg/dL  Very high:       >189 mg/dl       Non HDL Cholesterol 11/16/2017 176* <130 mg/dL Final    Comment: Above Desirable:  130-159 mg/dl  Borderline high:  160-189  mg/dl  High:             190-219 mg/dl  Very high:       >219 mg/dl        Reviewed health maintenance  Patient Active Problem List   Diagnosis     Hypothyroid     GERD (gastroesophageal reflux disease)     ED (erectile dysfunction)     Sleep apnea     CARDIOVASCULAR SCREENING; LDL GOAL LESS THAN 130     Hypertension goal BP (blood pressure) < 140/90     Advanced directives, counseling/discussion     Obesity     DDD (degenerative disc disease), lumbar     High cholesterol     Gastroesophageal reflux disease with esophagitis     WILBERTO (obstructive sleep apnea)     Primary osteoarthritis of right hand         OBJECTIVE:  no apparent distress  /84  Pulse 67  Temp 98.5  F (36.9  C) (Oral)  Wt 252 lb 9.6 oz (114.6 kg)  SpO2 98%  BMI 34.26 kg/m2     Head: Normocephalic. No masses, lesions, tenderness or abnormalities.  Neck::Neck supple. No adenopathy. Thyroid symmetric, normal size.    Cardiovascular: negative. No lifts, heaves, or thrills. RRR. No murmurs, clicks gallops or rubs  Respiratory. Good diaphragmatic excursion. Lungs clear  Gastrointestinal:Abdomen soft, non-tender.  No masses, organomegaly    Orders Only on 11/16/2017   Component Date Value Ref Range Status     TSH 11/16/2017 2.22  0.40 - 4.00 mU/L Final     Sodium 11/16/2017 140  133 - 144 mmol/L Final     Potassium 11/16/2017 4.7  3.4 - 5.3 mmol/L Final     Chloride 11/16/2017 105  94 - 109 mmol/L Final     Carbon Dioxide 11/16/2017 29  20 - 32 mmol/L Final     Anion Gap 11/16/2017 6  3 - 14 mmol/L Final     Glucose 11/16/2017 110* 70 - 99 mg/dL Final    Fasting specimen     Urea Nitrogen 11/16/2017 18  7 - 30 mg/dL Final     Creatinine 11/16/2017 1.18  0.66 - 1.25 mg/dL Final     GFR Estimate 11/16/2017 61  >60 mL/min/1.7m2 Final    Non  GFR Calc     GFR Estimate If Black 11/16/2017 73  >60 mL/min/1.7m2 Final    African American GFR Calc     Calcium 11/16/2017 9.1  8.5 - 10.1 mg/dL Final     Cholesterol 11/16/2017 210* <200 mg/dL  Final    Desirable:       <200 mg/dl     Triglycerides 11/16/2017 266* <150 mg/dL Final    Comment: Borderline high:  150-199 mg/dl  High:             200-499 mg/dl  Very high:       >499 mg/dl  Fasting specimen       HDL Cholesterol 11/16/2017 34* >39 mg/dL Final     LDL Cholesterol Calculated 11/16/2017 123* <100 mg/dL Final    Comment: Above desirable:  100-129 mg/dl  Borderline High:  130-159 mg/dL  High:             160-189 mg/dL  Very high:       >189 mg/dl       Non HDL Cholesterol 11/16/2017 176* <130 mg/dL Final    Comment: Above Desirable:  130-159 mg/dl  Borderline high:  160-189 mg/dl  High:             190-219 mg/dl  Very high:       >219 mg/dl           ICD-10-CM    1. Medicare annual wellness visit, subsequent Z00.00    2. Hypertension goal BP (blood pressure) < 140/90 I10 amLODIPine (NORVASC) 10 MG tablet   3. Herpes simplex type 2 infection B00.9 acyclovir (ZOVIRAX) 400 MG tablet   4. Essential hypertension I10 atenolol (TENORMIN) 50 MG tablet   5. Nasal congestion R09.81    6. Hypothyroidism, unspecified type E03.9 levothyroxine (LEVOXYL) 150 MCG tablet   7. Gastroesophageal reflux disease with esophagitis K21.0 omeprazole (PRILOSEC) 20 MG CR capsule   8. Primary osteoarthritis of right hand M19.041 diclofenac (VOLTAREN) 1 % GEL topical gel    PLAN: Follow up in 6 months        SUBJECTIVE:  72 year old enters for recheck of thyroid. Pt. Has been compliant with medications and has no side effect. Denies temp intol no tremor or palpitations.  Patient Active Problem List   Diagnosis     Hypothyroid     GERD (gastroesophageal reflux disease)     ED (erectile dysfunction)     Sleep apnea     CARDIOVASCULAR SCREENING; LDL GOAL LESS THAN 130     Hypertension goal BP (blood pressure) < 140/90     Advanced directives, counseling/discussion     Obesity     DDD (degenerative disc disease), lumbar     High cholesterol     Gastroesophageal reflux disease with esophagitis     WILBERTO (obstructive sleep apnea)      "Primary osteoarthritis of right hand     Reviewed health maintenance      OBJECTIVE:  Exam:  Constitutional: :\"healthy\",\"alert\",\"no distress\"}  Head: :\"Normocephalic. No masses, lesions, tenderness or abnormalities\"}  Neck:::\"Neck supple. No adenopathy. Thyroid symmetric, normal size,\"  Cardiovascular:\"negative\",\"PMI normal. No lifts, heaves, or thrills. RRR. No murmurs, clicks gallops or rub\"}  Respiratory: :\"negative\", Good diaphragmatic excursion. Lungs clear\"}  TSH   Date Value Ref Range Status   11/16/2017 2.22 0.40 - 4.00 mU/L Final   ]            Today's PHQ-2 Score: PHQ-2 ( 1999 [employer]) 12/14/2017   Q1: Little interest or pleasure in doing things 0   Q2: Feeling down, depressed or hopeless 0   PHQ-2 Score 0   Q1: Little interest or pleasure in doing things Not at all   Q2: Feeling down, depressed or hopeless Not at all   PHQ-2 Score 0       Do you feel safe in your environment - Yes    Do you have a Health Care Directive?: Yes: Patient states has Advance Directive and will bring in a copy to clinic.    Current providers sharing in care for this patient include:   Patient Care Team:  Chay Rudd MD as PCP - General    The following health maintenance items are reviewed in Epic and correct as of today:  Health Maintenance   Topic Date Due     MEDICARE ANNUAL WELLNESS VISIT  12/02/1963     PNEUMOCOCCAL (1 of 2 - PCV13) 12/02/2010     AORTIC ANEURYSM SCREENING (SYSTEM ASSIGNED)  12/02/2010     ADVANCE DIRECTIVE PLANNING Q5 YRS  09/08/2016     INFLUENZA VACCINE (SYSTEM ASSIGNED)  09/01/2017     FALL RISK ASSESSMENT  11/22/2017     TSH Q1 YEAR  11/16/2018     COLON CANCER SCREEN (SYSTEM ASSIGNED)  11/15/2020     LIPID SCREEN Q5 YR MALE (SYSTEM ASSIGNED)  11/16/2022     TETANUS IMMUNIZATION (SYSTEM ASSIGNED)  11/13/2023     HEPATITIS C SCREENING  Completed          Pneumonia Vaccine:Adults age 65+ who received Pneumovax (PPSV23) at 65 years or older: Should be given PCV13 > 1 year after their most recent " PPSV23    Review of Systems  C: NEGATIVE for fever, chills, change in weight  I: NEGATIVE for worrisome rashes, moles or lesions  E: NEGATIVE for vision changes or irritation  E/M: NEGATIVE for ear, mouth and throat problems  R: NEGATIVE for significant cough or SOB  B: NEGATIVE for masses, tenderness or discharge  CV: NEGATIVE for chest pain, palpitations or peripheral edema  GI: NEGATIVE for nausea, abdominal pain, heartburn, or change in bowel habits  : NEGATIVE for frequency, dysuria, or hematuria  M: NEGATIVE for significant arthralgias or myalgia  N: NEGATIVE for weakness, dizziness or paresthesias  E: NEGATIVE for temperature intolerance, skin/hair changes  H: NEGATIVE for bleeding problems  P: NEGATIVE for changes in mood or affect    OBJECTIVE:   /84  Pulse 67  Temp 98.5  F (36.9  C) (Oral)  Wt 252 lb 9.6 oz (114.6 kg)  SpO2 98%  BMI 34.26 kg/m2 Estimated body mass index is 34.26 kg/(m^2) as calculated from the following:    Height as of 3/29/17: 6' (1.829 m).    Weight as of this encounter: 252 lb 9.6 oz (114.6 kg).  Physical Exam  GENERAL: healthy, alert and no distress  EYES: Eyes grossly normal to inspection, PERRL and conjunctivae and sclerae normal  HENT: ear canals and TM's normal, nose and mouth without ulcers or lesions  NECK: no adenopathy, no asymmetry, masses, or scars and thyroid normal to palpation  RESP: lungs clear to auscultation - no rales, rhonchi or wheezes  CV: regular rate and rhythm, normal S1 S2, no S3 or S4, no murmur, click or rub, no peripheral edema and peripheral pulses strong  ABDOMEN: soft, nontender, no hepatosplenomegaly, no masses and bowel sounds normal  MS: no gross musculoskeletal defects noted, no edema  SKIN: no suspicious lesions or rashes  NEURO: Normal strength and tone, mentation intact and speech normal  PSYCH: mentation appears normal, affect normal/bright    ASSESSMENT / PLAN:       ICD-10-CM    1. Medicare annual wellness visit, subsequent Z00.00     2. Hypertension goal BP (blood pressure) < 140/90 I10 amLODIPine (NORVASC) 10 MG tablet   3. Herpes simplex type 2 infection B00.9 acyclovir (ZOVIRAX) 400 MG tablet   4. Essential hypertension I10 atenolol (TENORMIN) 50 MG tablet   5. Nasal congestion R09.81    6. Hypothyroidism, unspecified type E03.9 levothyroxine (LEVOXYL) 150 MCG tablet   7. Gastroesophageal reflux disease with esophagitis K21.0 omeprazole (PRILOSEC) 20 MG CR capsule   8. Primary osteoarthritis of right hand M19.041 diclofenac (VOLTAREN) 1 % GEL topical gel       End of Life Planning:  Patient currently has an advanced directive: Yes.  Practitioner is supportive of decision.    COUNSELING:  Reviewed preventive health counseling, as reflected in patient instructions       Regular exercise       Healthy diet/nutrition        Estimated body mass index is 34.26 kg/(m^2) as calculated from the following:    Height as of 3/29/17: 6' (1.829 m).    Weight as of this encounter: 252 lb 9.6 oz (114.6 kg).  Weight management plan: Patient was referred to their PCP to discuss a diet and exercise plan. smaller portions and excercise   reports that he quit smoking about 46 years ago. He has never used smokeless tobacco.        Appropriate preventive services were discussed with this patient, including applicable screening as appropriate for cardiovascular disease, diabetes, osteopenia/osteoporosis, and glaucoma.  As appropriate for age/gender, discussed screening for colorectal cancer, prostate cancer, breast cancer, and cervical cancer. Checklist reviewing preventive services available has been given to the patient.    Reviewed patients plan of care and provided an AVS. The Basic Care Plan (routine screening as documented in Health Maintenance) for Kumar meets the Care Plan requirement. This Care Plan has been established and reviewed with the Patient.    Counseling Resources:  ATP IV Guidelines  Pooled Cohorts Equation Calculator  Breast Cancer Risk  Calculator  FRAX Risk Assessment  ICSI Preventive Guidelines  Dietary Guidelines for Americans, 2010  USDA's MyPlate  ASA Prophylaxis  Lung CA Screening    Chay Rudd MD  [facility] ANDOVERAnswers for HPI/ROS submitted by the patient on 12/14/2017   PHQ-2 Score: 0

## 2018-01-09 ENCOUNTER — RADIANT APPOINTMENT (OUTPATIENT)
Dept: ULTRASOUND IMAGING | Facility: CLINIC | Age: 73
End: 2018-01-09
Attending: FAMILY MEDICINE
Payer: MEDICARE

## 2018-01-09 DIAGNOSIS — I10 ESSENTIAL HYPERTENSION: ICD-10-CM

## 2018-01-09 PROCEDURE — 76775 US EXAM ABDO BACK WALL LIM: CPT

## 2018-01-30 DIAGNOSIS — E03.9 HYPOTHYROIDISM, UNSPECIFIED TYPE: ICD-10-CM

## 2018-01-31 RX ORDER — LEVOTHYROXINE SODIUM 150 UG/1
TABLET ORAL
OUTPATIENT
Start: 2018-01-31

## 2018-06-07 DIAGNOSIS — I10 HYPERTENSION GOAL BP (BLOOD PRESSURE) < 140/90: ICD-10-CM

## 2018-06-07 DIAGNOSIS — I10 ESSENTIAL HYPERTENSION: ICD-10-CM

## 2018-06-07 RX ORDER — ATENOLOL 50 MG/1
TABLET ORAL
Qty: 30 TABLET | Refills: 0 | Status: SHIPPED | OUTPATIENT
Start: 2018-06-07 | End: 2018-06-20

## 2018-06-07 RX ORDER — AMLODIPINE BESYLATE 10 MG/1
TABLET ORAL
Qty: 30 TABLET | Refills: 0 | Status: SHIPPED | OUTPATIENT
Start: 2018-06-07 | End: 2018-06-20

## 2018-06-07 NOTE — LETTER
June 7, 2018    Mamadou Pena  [address]  [place] [address]    Dear Mamadou,       We recently received a refill request for Hypertension Medication.  We have refilled this for a one time 30 day supply only because you are due for a:    Hypertension office visit      Please call at your earliest convenience so that there will not be a delay with your future refills.          Thank you,   Your [facility] Team/mkr  [phone]

## 2018-06-20 ENCOUNTER — OFFICE VISIT (OUTPATIENT)
Dept: FAMILY MEDICINE | Facility: CLINIC | Age: 73
End: 2018-06-20
Payer: MEDICARE

## 2018-06-20 VITALS
BODY MASS INDEX: 35.84 KG/M2 | SYSTOLIC BLOOD PRESSURE: 131 MMHG | DIASTOLIC BLOOD PRESSURE: 73 MMHG | TEMPERATURE: 98.5 F | WEIGHT: 256 LBS | HEART RATE: 58 BPM | OXYGEN SATURATION: 98 % | RESPIRATION RATE: 18 BRPM | HEIGHT: 71 IN

## 2018-06-20 DIAGNOSIS — I10 ESSENTIAL HYPERTENSION: ICD-10-CM

## 2018-06-20 DIAGNOSIS — E03.9 HYPOTHYROIDISM, UNSPECIFIED TYPE: ICD-10-CM

## 2018-06-20 DIAGNOSIS — M17.0 PRIMARY OSTEOARTHRITIS OF BOTH KNEES: Primary | ICD-10-CM

## 2018-06-20 DIAGNOSIS — I10 HYPERTENSION GOAL BP (BLOOD PRESSURE) < 140/90: ICD-10-CM

## 2018-06-20 DIAGNOSIS — K21.00 GASTROESOPHAGEAL REFLUX DISEASE WITH ESOPHAGITIS: ICD-10-CM

## 2018-06-20 LAB
ANION GAP SERPL CALCULATED.3IONS-SCNC: 12 MMOL/L (ref 3–14)
BUN SERPL-MCNC: 17 MG/DL (ref 7–30)
CALCIUM SERPL-MCNC: 8.4 MG/DL (ref 8.5–10.1)
CHLORIDE SERPL-SCNC: 106 MMOL/L (ref 94–109)
CO2 SERPL-SCNC: 22 MMOL/L (ref 20–32)
CREAT SERPL-MCNC: 1.19 MG/DL (ref 0.66–1.25)
GFR SERPL CREATININE-BSD FRML MDRD: 60 ML/MIN/1.7M2
GLUCOSE SERPL-MCNC: 99 MG/DL (ref 70–99)
POTASSIUM SERPL-SCNC: 4.2 MMOL/L (ref 3.4–5.3)
SODIUM SERPL-SCNC: 140 MMOL/L (ref 133–144)
TSH SERPL DL<=0.005 MIU/L-ACNC: 1.36 MU/L (ref 0.4–4)

## 2018-06-20 PROCEDURE — 84443 ASSAY THYROID STIM HORMONE: CPT | Performed by: FAMILY MEDICINE

## 2018-06-20 PROCEDURE — 80048 BASIC METABOLIC PNL TOTAL CA: CPT | Performed by: FAMILY MEDICINE

## 2018-06-20 PROCEDURE — 99214 OFFICE O/P EST MOD 30 MIN: CPT | Performed by: FAMILY MEDICINE

## 2018-06-20 PROCEDURE — 36415 COLL VENOUS BLD VENIPUNCTURE: CPT | Performed by: FAMILY MEDICINE

## 2018-06-20 RX ORDER — AMLODIPINE BESYLATE 10 MG/1
TABLET ORAL
Qty: 90 TABLET | Refills: 0 | Status: SHIPPED | OUTPATIENT
Start: 2018-06-20 | End: 2018-10-01

## 2018-06-20 RX ORDER — ATENOLOL 50 MG/1
TABLET ORAL
Qty: 90 TABLET | Refills: 1 | Status: SHIPPED | OUTPATIENT
Start: 2018-06-20 | End: 2019-07-04

## 2018-06-20 RX ORDER — LEVOTHYROXINE SODIUM 150 UG/1
150 TABLET ORAL DAILY
Qty: 90 TABLET | Refills: 3 | Status: SHIPPED | OUTPATIENT
Start: 2018-06-20 | End: 2019-01-31

## 2018-06-20 ASSESSMENT — PAIN SCALES - GENERAL: PAINLEVEL: NO PAIN (0)

## 2018-06-20 NOTE — MR AVS SNAPSHOT
After Visit Summary   6/20/2018    Mamadou Pena    MRN: [id number]           Patient Information     Date Of Birth          1945        Visit Information        Provider Department      6/20/2018 2:30 PM Chay Rudd MD [facility]        Today's Diagnoses     Primary osteoarthritis of both knees    -  1    Hypertension goal BP (blood pressure) < 140/90        Essential hypertension        Hypothyroidism, unspecified type        Gastroesophageal reflux disease with esophagitis           Follow-ups after your visit        Additional Services     GASTROENTEROLOGY ADULT REF PROCEDURE ONLY       Last Lab Result: Creatinine (mg/dL)       Date                     Value                 11/16/2017               1.18             ----------  Body mass index is 35.7 kg/(m^2).      Patient will be contacted to schedule procedure.     Please be aware that coverage of these services is subject to the terms and limitations of your health insurance plan.  Call member services at your health plan with any benefit or coverage questions.  Any procedures must be performed at a [facility] facility OR coordinated by your clinic's referral office.    Please bring the following with you to your appointment:    (1) Any X-Rays, CTs or MRIs which have been performed.  Contact the facility where they were done to arrange for  prior to your scheduled appointment.    (2) List of current medications   (3) This referral request   (4) Any documents/labs given to you for this referral            ORTHOPEDICS ADULT REFERRAL       Your provider has referred you to: [facility]: [facility] [phone]   [email or web address]    Please be aware that coverage of these services is subject to the terms and limitations of your health insurance plan.  Call member services at your health plan with any benefit or coverage questions.      Please bring the following to your  "appointment:    >>   Any x-rays, CTs or MRIs which have been performed.  Contact the facility where they were done to arrange for  prior to your scheduled appointment.    >>   List of current medications   >>   This referral request   >>   Any documents/labs given to you for this referral                  Who to contact     If you have questions or need follow up information about today's clinic visit or your schedule please contact [facility] directly at [phone].  Normal or non-critical lab and imaging results will be communicated to you by [employer], letter or phone within 4 business days after the clinic has received the results. If you do not hear from us within 7 days, please contact the clinic through [employer] or phone. If you have a critical or abnormal lab result, we will notify you by phone as soon as possible.  Submit refill requests through [facility] or call your pharmacy and they will forward the refill request to us. Please allow 3 business days for your refill to be completed.          Additional Information About Your Visit        [facility] Information     [facility] gives you secure access to your electronic health record. If you see a primary care provider, you can also send messages to your care team and make appointments. If you have questions, please call your primary care clinic.  If you do not have a primary care provider, please call [phone] and they will assist you.        Care EveryWhere ID     This is your Care EveryWhere ID. This could be used by other organizations to access your [place] medical records  [id number]        Your Vitals Were     Pulse Temperature Respirations Height Pulse Oximetry BMI (Body Mass Index)    58 98.5  F (36.9  C) (Oral) 18 5' 11\" (1.803 m) 98% 35.7 kg/m2       Blood Pressure from Last 3 Encounters:   06/20/18 131/73   12/14/17 149/84   03/29/17 135/79    Weight from Last 3 Encounters:   06/20/18 256 lb (116.1 kg)   12/14/17 252 lb 9.6 oz " (114.6 kg)   03/29/17 261 lb (118.4 kg)              We Performed the Following     Basic metabolic panel     GASTROENTEROLOGY ADULT REF PROCEDURE ONLY     ORTHOPEDICS ADULT REFERRAL     TSH with free T4 reflex          Where to get your medicines      These medications were sent to [id number] - [place], [place] - [facility] [address] AT [address] [place], [place] [address]     Phone:  [phone]     amLODIPine 10 MG tablet    atenolol 50 MG tablet    levothyroxine 150 MCG tablet    omeprazole 20 MG CR capsule          Primary Care Provider Office Phone # Fax #    Chay Rigo Rudd [phone] [id number]  [facility] [address]        Equal Access to Services     DARRELL PERDOMO : Hadii amy alexander hadasho Soomaali, waaxda luqadaha, qaybta kaalmada adeegyada, yadira cadena . So [facility] [phone].    ATENCIÓN: Si habla español, tiene a french disposición servicios gratuitos de asistencia lingüística. [facility] [phone].    We comply with applicable federal civil rights laws and [place] laws. We do not discriminate on the basis of race, color, national origin, age, disability, sex, sexual orientation, or gender identity.            Thank you!     Thank you for choosing [facility]  for your care. Our goal is always to provide you with excellent care. Hearing back from our patients is one way we can continue to improve our services. Please take a few minutes to complete the written survey that you may receive in the mail after your visit with us. Thank you!             Your Updated Medication List - Protect others around you: Learn how to safely use, store and throw away your medicines at www.disposemymeds.org.          This list is accurate as of 6/20/18  3:10 PM.  Always use your most recent med list.                   Brand Name Dispense Instructions for use Diagnosis    acyclovir 400 MG tablet     ZOVIRAX    90 tablet    Take 1 tablet (400 mg) by mouth daily    Herpes simplex type 2 infection       amLODIPine 10 MG tablet    NORVASC    90 tablet    TAKE 1 TABLET(10 MG) BY MOUTH DAILY    Hypertension goal BP (blood pressure) < 140/90, Essential hypertension, Hypothyroidism, unspecified type, Gastroesophageal reflux disease with esophagitis       aspirin 325 MG EC tablet      1 TABLET DAILY        atenolol 50 MG tablet    TENORMIN    90 tablet    TAKE 1 TABLET(50 MG) BY MOUTH DAILY    Essential hypertension, Hypertension goal BP (blood pressure) < 140/90, Hypothyroidism, unspecified type, Gastroesophageal reflux disease with esophagitis       CENTRUM SILVER per tablet      Take 1 tablet by mouth daily        diclofenac 1 % Gel topical gel    VOLTAREN    100 g    Apply 4 grams to knees or 2 grams to hands four times daily using enclosed dosing card.    Primary osteoarthritis of right hand       fluticasone 50 MCG/ACT spray    FLONASE    16 g    Spray 1-2 sprays into both nostrils daily    Nasal congestion       levothyroxine 150 MCG tablet    LEVOXYL    90 tablet    Take 1 tablet (150 mcg) by mouth daily    Hypothyroidism, unspecified type, Hypertension goal BP (blood pressure) < 140/90, Essential hypertension, Gastroesophageal reflux disease with esophagitis       omeprazole 20 MG CR capsule    priLOSEC    90 capsule    Take 1 capsule (20 mg) by mouth daily    Gastroesophageal reflux disease with esophagitis, Hypertension goal BP (blood pressure) < 140/90, Essential hypertension, Hypothyroidism, unspecified type       * order for DME     1 Units    Needs CPAP supplies  He continue to use and benefit from the CPAP 780.57C Sleep apnea    WILBERTO (obstructive sleep apnea)       * order for DME     1 Units    Equipment being ordered: CPAP mask and supplies    WILBERTO (obstructive sleep apnea)       order for DME     1 Units    Equipment being ordered: CPAP mask and supplies    WILBERTO (obstructive sleep apnea)       *  Notice:  This list has 2 medication(s) that are the same as other medications prescribed for you. Read the directions carefully, and ask your doctor or other care provider to review them with you.       Secondary Intention Text (Leave Blank If You Do Not Want): The defect will heal with secondary intention.

## 2018-06-20 NOTE — NURSING NOTE
"Chief Complaint   Patient presents with     Hypertension   right knee pain       Initial /73  Pulse 58  Temp 98.5  F (36.9  C) (Oral)  Resp 18  Ht 5' 11\" (1.803 m)  Wt 256 lb (116.1 kg)  SpO2 98%  BMI 35.7 kg/m2 Estimated body mass index is 35.7 kg/(m^2) as calculated from the following:    Height as of this encounter: 5' 11\" (1.803 m).    Weight as of this encounter: 256 lb (116.1 kg).  Medication Reconciliation: complete  Rae Torres M.A.    "

## 2018-06-20 NOTE — PROGRESS NOTES
SUBJECTIVE:  72 year oldyear old male enters with a hx of hypertension.  Pt. Has been compliant with medications and medications were reviewed.  No side effects. No chest pain or sob. Low sodium diet.    Current Outpatient Prescriptions:      acyclovir (ZOVIRAX) 400 MG tablet, Take 1 tablet (400 mg) by mouth daily, Disp: 90 tablet, Rfl: 3     amLODIPine (NORVASC) 10 MG tablet, TAKE 1 TABLET(10 MG) BY MOUTH DAILY, Disp: 30 tablet, Rfl: 0     ASPIRIN 325 MG OR TBEC, 1 TABLET DAILY, Disp: , Rfl:      atenolol (TENORMIN) 50 MG tablet, TAKE 1 TABLET(50 MG) BY MOUTH DAILY, Disp: 30 tablet, Rfl: 0     diclofenac (VOLTAREN) 1 % GEL topical gel, Apply 4 grams to knees or 2 grams to hands four times daily using enclosed dosing card., Disp: 100 g, Rfl: 1     fluticasone (FLONASE) 50 MCG/ACT nasal spray, Spray 1-2 sprays into both nostrils daily, Disp: 16 g, Rfl: 5     levothyroxine (LEVOXYL) 150 MCG tablet, Take 1 tablet (150 mcg) by mouth daily, Disp: 90 tablet, Rfl: 3     Multiple Vitamins-Minerals (CENTRUM SILVER) per tablet, Take 1 tablet by mouth daily, Disp: , Rfl:      omeprazole (PRILOSEC) 20 MG CR capsule, Take 1 capsule (20 mg) by mouth daily, Disp: 90 capsule, Rfl: 3     order for DME, Needs CPAP supplies  He continue to use and benefit from the CPAP 780.57C Sleep apnea, Disp: 1 Units, Rfl: 1     order for DME, Equipment being ordered: CPAP mask and supplies, Disp: 1 Units, Rfl: 1     order for DME, Equipment being ordered: CPAP mask and supplies, Disp: 1 Units, Rfl: 0  Past Medical History:   Diagnosis Date     Allergic rhinitis      ED (erectile dysfunction)      GERD (gastroesophageal reflux disease)      Hypothyroid      Sleep apnea      Unspecified essential hypertension      Orders Only on 11/16/2017   Component Date Value Ref Range Status     TSH 11/16/2017 2.22  0.40 - 4.00 mU/L Final     Sodium 11/16/2017 140  133 - 144 mmol/L Final     Potassium 11/16/2017 4.7  3.4 - 5.3 mmol/L Final     Chloride  "11/16/2017 105  94 - 109 mmol/L Final     Carbon Dioxide 11/16/2017 29  20 - 32 mmol/L Final     Anion Gap 11/16/2017 6  3 - 14 mmol/L Final     Glucose 11/16/2017 110* 70 - 99 mg/dL Final    Fasting specimen     Urea Nitrogen 11/16/2017 18  7 - 30 mg/dL Final     Creatinine 11/16/2017 1.18  0.66 - 1.25 mg/dL Final     GFR Estimate 11/16/2017 61  >60 mL/min/1.7m2 Final    Non  GFR Calc     GFR Estimate If Black 11/16/2017 73  >60 mL/min/1.7m2 Final    African American GFR Calc     Calcium 11/16/2017 9.1  8.5 - 10.1 mg/dL Final     Cholesterol 11/16/2017 210* <200 mg/dL Final    Desirable:       <200 mg/dl     Triglycerides 11/16/2017 266* <150 mg/dL Final    Comment: Borderline high:  150-199 mg/dl  High:             200-499 mg/dl  Very high:       >499 mg/dl  Fasting specimen       HDL Cholesterol 11/16/2017 34* >39 mg/dL Final     LDL Cholesterol Calculated 11/16/2017 123* <100 mg/dL Final    Comment: Above desirable:  100-129 mg/dl  Borderline High:  130-159 mg/dL  High:             160-189 mg/dL  Very high:       >189 mg/dl       Non HDL Cholesterol 11/16/2017 176* <130 mg/dL Final    Comment: Above Desirable:  130-159 mg/dl  Borderline high:  160-189 mg/dl  High:             190-219 mg/dl  Very high:       >219 mg/dl        Reviewed health maintenance  Patient Active Problem List   Diagnosis     Hypothyroid     GERD (gastroesophageal reflux disease)     ED (erectile dysfunction)     Sleep apnea     CARDIOVASCULAR SCREENING; LDL GOAL LESS THAN 130     Hypertension goal BP (blood pressure) < 140/90     Advanced directives, counseling/discussion     Obesity     DDD (degenerative disc disease), lumbar     High cholesterol     Gastroesophageal reflux disease with esophagitis     WILBERTO (obstructive sleep apnea)     Primary osteoarthritis of right hand         OBJECTIVE:  no apparent distress  /73  Pulse 58  Temp 98.5  F (36.9  C) (Oral)  Resp 18  Ht 5' 11\" (1.803 m)  Wt 256 lb (116.1 kg)  " SpO2 98%  BMI 35.7 kg/m2     Head: Normocephalic. No masses, lesions, tenderness or abnormalities.  Neck::Neck supple. No adenopathy. Thyroid symmetric, normal size.    Cardiovascular: negative. No lifts, heaves, or thrills. RRR. No murmurs, clicks gallops or rubs  Respiratory. Good diaphragmatic excursion. Lungs clear  Gastrointestinal:Abdomen soft, non-tender.  No masses, organomegaly    Orders Only on 11/16/2017   Component Date Value Ref Range Status     TSH 11/16/2017 2.22  0.40 - 4.00 mU/L Final     Sodium 11/16/2017 140  133 - 144 mmol/L Final     Potassium 11/16/2017 4.7  3.4 - 5.3 mmol/L Final     Chloride 11/16/2017 105  94 - 109 mmol/L Final     Carbon Dioxide 11/16/2017 29  20 - 32 mmol/L Final     Anion Gap 11/16/2017 6  3 - 14 mmol/L Final     Glucose 11/16/2017 110* 70 - 99 mg/dL Final    Fasting specimen     Urea Nitrogen 11/16/2017 18  7 - 30 mg/dL Final     Creatinine 11/16/2017 1.18  0.66 - 1.25 mg/dL Final     GFR Estimate 11/16/2017 61  >60 mL/min/1.7m2 Final    Non  GFR Calc     GFR Estimate If Black 11/16/2017 73  >60 mL/min/1.7m2 Final    African American GFR Calc     Calcium 11/16/2017 9.1  8.5 - 10.1 mg/dL Final     Cholesterol 11/16/2017 210* <200 mg/dL Final    Desirable:       <200 mg/dl     Triglycerides 11/16/2017 266* <150 mg/dL Final    Comment: Borderline high:  150-199 mg/dl  High:             200-499 mg/dl  Very high:       >499 mg/dl  Fasting specimen       HDL Cholesterol 11/16/2017 34* >39 mg/dL Final     LDL Cholesterol Calculated 11/16/2017 123* <100 mg/dL Final    Comment: Above desirable:  100-129 mg/dl  Borderline High:  130-159 mg/dL  High:             160-189 mg/dL  Very high:       >189 mg/dl       Non HDL Cholesterol 11/16/2017 176* <130 mg/dL Final    Comment: Above Desirable:  130-159 mg/dl  Borderline high:  160-189 mg/dl  High:             190-219 mg/dl  Very high:       >219 mg/dl           ICD-10-CM    1. Hypertension goal BP (blood pressure) <  "140/90 I10 amLODIPine (NORVASC) 10 MG tablet     atenolol (TENORMIN) 50 MG tablet     levothyroxine (LEVOXYL) 150 MCG tablet     omeprazole (PRILOSEC) 20 MG CR capsule   2. Essential hypertension I10 amLODIPine (NORVASC) 10 MG tablet     atenolol (TENORMIN) 50 MG tablet     levothyroxine (LEVOXYL) 150 MCG tablet     omeprazole (PRILOSEC) 20 MG CR capsule   3. Hypothyroidism, unspecified type E03.9 amLODIPine (NORVASC) 10 MG tablet     atenolol (TENORMIN) 50 MG tablet     levothyroxine (LEVOXYL) 150 MCG tablet     omeprazole (PRILOSEC) 20 MG CR capsule   4. Gastroesophageal reflux disease with esophagitis K21.0 amLODIPine (NORVASC) 10 MG tablet     atenolol (TENORMIN) 50 MG tablet     levothyroxine (LEVOXYL) 150 MCG tablet     omeprazole (PRILOSEC) 20 MG CR capsule    PLAN: Follow up in 6 months       SUBJECTIVE:  72 year old enters for recheck of thyroid. Pt. Has been compliant with medications and has no side effect. Denies temp intol no tremor or palpitations.  Patient Active Problem List   Diagnosis     Hypothyroid     GERD (gastroesophageal reflux disease)     ED (erectile dysfunction)     Sleep apnea     CARDIOVASCULAR SCREENING; LDL GOAL LESS THAN 130     Hypertension goal BP (blood pressure) < 140/90     Advanced directives, counseling/discussion     Obesity     DDD (degenerative disc disease), lumbar     High cholesterol     Gastroesophageal reflux disease with esophagitis     WILBERTO (obstructive sleep apnea)     Primary osteoarthritis of right hand     Reviewed health maintenance      OBJECTIVE:  Exam:  Constitutional: :\"healthy\",\"alert\",\"no distress\"}  Head: :\"Normocephalic. No masses, lesions, tenderness or abnormalities\"}  Neck:::\"Neck supple. No adenopathy. Thyroid symmetric, normal size,\"  Cardiovascular:\"negative\",\"PMI normal. No lifts, heaves, or thrills. RRR. No murmurs, clicks gallops or rub\"}  Respiratory: :\"negative\", Good diaphragmatic excursion. Lungs clear\"}  TSH   Date Value Ref Range Status " "  11/16/2017 2.22 0.40 - 4.00 mU/L Final   ]    SUBJECTIVE:  72 year old.The patient has a history of gerd for   years.  Symptoms include heartburn. Caffeine intake 3 cups per day. Etoh one day asa or nsaids none smoking n. Better with omeprazole.   Nausea n vomiting n. Endoscopy no  Reviewed health maintenance   Patient Active Problem List   Diagnosis     Hypothyroid     GERD (gastroesophageal reflux disease)     ED (erectile dysfunction)     Sleep apnea     CARDIOVASCULAR SCREENING; LDL GOAL LESS THAN 130     Hypertension goal BP (blood pressure) < 140/90     Advanced directives, counseling/discussion     Obesity     DDD (degenerative disc disease), lumbar     High cholesterol     Gastroesophageal reflux disease with esophagitis     WILBERTO (obstructive sleep apnea)     Primary osteoarthritis of right hand         OBJECTIVE:  Exam:  Constitutional: healthy, alert and no distress  Head: Normocephalic. No masses, lesions, tenderness or abnormalities  Neck: Neck supple. No adenopathy. Thyroid symmetric, normal size,, Carotids without bruits.  ENT: ENT exam normal, no neck nodes or sinus tenderness  Cardiovascular: negative, PMI normal. No lifts, heaves, or thrills. RRR. No murmurs, clicks gallops or rub  Respiratory: negative\",Good diaphragmatic excursion. Lungs clear  Gastrointestinal: Abdomen soft, non-tender. BS normal. No masses, organomegaly         ICD-10-CM    1. Hypertension goal BP (blood pressure) < 140/90 I10 amLODIPine (NORVASC) 10 MG tablet     atenolol (TENORMIN) 50 MG tablet     levothyroxine (LEVOXYL) 150 MCG tablet     omeprazole (PRILOSEC) 20 MG CR capsule   2. Essential hypertension I10 amLODIPine (NORVASC) 10 MG tablet     atenolol (TENORMIN) 50 MG tablet     levothyroxine (LEVOXYL) 150 MCG tablet     omeprazole (PRILOSEC) 20 MG CR capsule     Basic metabolic panel   3. Hypothyroidism, unspecified type E03.9 amLODIPine (NORVASC) 10 MG tablet     atenolol (TENORMIN) 50 MG tablet     levothyroxine " (LEVOXYL) 150 MCG tablet     omeprazole (PRILOSEC) 20 MG CR capsule     TSH with free T4 reflex   4. Gastroesophageal reflux disease with esophagitis K21.0 amLODIPine (NORVASC) 10 MG tablet     atenolol (TENORMIN) 50 MG tablet     levothyroxine (LEVOXYL) 150 MCG tablet     omeprazole (PRILOSEC) 20 MG CR capsule     GASTROENTEROLOGY ADULT REF PROCEDURE ONLY    PLAN:Follow up in 1 year

## 2018-06-21 ENCOUNTER — OFFICE VISIT (OUTPATIENT)
Dept: ORTHOPEDICS | Facility: CLINIC | Age: 73
End: 2018-06-21
Payer: MEDICARE

## 2018-06-21 ENCOUNTER — RADIANT APPOINTMENT (OUTPATIENT)
Dept: GENERAL RADIOLOGY | Facility: CLINIC | Age: 73
End: 2018-06-21
Attending: ORTHOPAEDIC SURGERY
Payer: MEDICARE

## 2018-06-21 VITALS
TEMPERATURE: 97.2 F | OXYGEN SATURATION: 98 % | HEART RATE: 59 BPM | DIASTOLIC BLOOD PRESSURE: 77 MMHG | RESPIRATION RATE: 18 BRPM | SYSTOLIC BLOOD PRESSURE: 151 MMHG | WEIGHT: 256 LBS | BODY MASS INDEX: 35.7 KG/M2

## 2018-06-21 DIAGNOSIS — M17.11 PRIMARY OSTEOARTHRITIS OF RIGHT KNEE: Primary | ICD-10-CM

## 2018-06-21 DIAGNOSIS — M17.0 PRIMARY OSTEOARTHRITIS OF BOTH KNEES: ICD-10-CM

## 2018-06-21 PROCEDURE — 99203 OFFICE O/P NEW LOW 30 MIN: CPT | Performed by: ORTHOPAEDIC SURGERY

## 2018-06-21 PROCEDURE — 73562 X-RAY EXAM OF KNEE 3: CPT | Mod: RT

## 2018-06-21 NOTE — MR AVS SNAPSHOT
After Visit Summary   6/21/2018    Mamadou Pena    MRN: [id number]           Patient Information     Date Of Birth          1945        Visit Information        Provider Department      6/21/2018 10:45 AM Otilio Valles MD [facility]        Today's Diagnoses     Primary osteoarthritis of both knees    -  1    Primary osteoarthritis of right knee          Care Instructions    Non-surgical treatment for knee arthritis includes:    * rest.  For acute flares. (Periodic).    * Activity modification - avoid impact activities or activities that aggravate symptoms.   Keeping joints moving may be the most important aspect of joint health.    * Tylenol as needed for pain, consider Tylenol arthritis or similar.  (no more than 3000mg of acetaminophen in a 24 hour period)    * NSAIDS (non-steroidal anti-inflammatory medications; e.g. Aleve, advil, motrin, ibuprofen, etc) - regular use for inflammation ( twice daily or three times daily), with food, as long as there are no contra-indications to using these medications. (if you have stomach ulcers, reflux, or kidney dysfunction, you should talk to your primary care provider to discuss whether these medications are right for your).  Please discuss other concerns with your primary care doctor if needed.    *Glucosamine-Chondroitin (1500 mg per day. Available over the counter)  has not been proven to help arthritis, yet some patients claim that it does help them with their arthritis pain.    * ice, 15-20 minutes at a time several times a day or as needed if there is swelling, or after an acute injury.  * Strengthening of quadriceps muscles  * Physical Therapy for strengthening, stretching and range of motion exercises of legs    * Weight loss: Your body mass index is 35.7 kg/(m^2). A healthy BMI is below 25.  Weight loss benefits, not only the current pain symptoms, but also overall health. Recommend a good diet plan that works for the  "patient, with the assistance of a dietician or primary care doctor as needed. Also, a good, low-impact exercise program for at least 20 minutes per day, 3 times per week, such as exercise bike, elliptical , or pool.    *DIET:  Although there is no set diet that will eliminate/cure arthritis, there are some foods that may help inflammation, which is the cause of the pain in arthritis, such as concentrated cherry juice, Tumeric, and Fish Oil.     Check out the Arthritis Foundation website for further diet suggestions to potentially reduce inflammation and pain:    http://www.arthritis.org/living-with-arthritis/arthritis-diet/best-foods-for-arthritis/    * Exercise: low impact such as stationary bike, elliptical, pool.  Some Knee exercises can be found at the Orthoinfo website:  https://orthoinfo.[employer].org/en/staying-healthy/knee-exercises/     * Injections: cortisone, versus viscosupplementation (hyaluronic acid, \"rooster comb\", \"gel shots\")  * Bracing: bracing the knee may offer some relief of symptoms when worn and provide some stability.  These can be over the counter, or custom-made \"\" braces that take the pressure off of the worn portion of the knee.  * over the counter supplements such as glucosamine-chondroitin sulfate may help with joint pain.  * topical ointments may help as well with pain    *Accupuncture may be helpful to control the pain.    *Surgical options include arthroscopy, osteotomy (correcting the alignment of the bone by cutting it), biologic resurfacing, cadaver partial transplant, partial or total knee replacement.  This should be discussed with Dr. Valles.          Follow-ups after your visit        Who to contact     If you have questions or need follow up information about today's clinic visit or your schedule please contact [facility] directly at [phone].  Normal or non-critical lab and imaging results will be communicated to you by MyChart, letter or phone " within 4 business days after the clinic has received the results. If you do not hear from us within 7 days, please contact the clinic through [employer] or phone. If you have a critical or abnormal lab result, we will notify you by phone as soon as possible.  Submit refill requests through [employer]i or call your pharmacy and they will forward the refill request to us. Please allow 3 business days for your refill to be completed.          Additional Information About Your Visit        [employer] Information     [employer] gives you secure access to your electronic health record. If you see a primary care provider, you can also send messages to your care team and make appointments. If you have questions, please call your primary care clinic.  If you do not have a primary care provider, please call [phone] and they will assist you.        Care EveryWhere ID     This is your Care EveryWhere ID. This could be used by other organizations to access your [place] medical records  [id number]        Your Vitals Were     Pulse Temperature Respirations Pulse Oximetry BMI (Body Mass Index)       59 97.2  F (36.2  C) (Oral) 18 98% 35.7 kg/m2        Blood Pressure from Last 3 Encounters:   06/21/18 151/77   06/20/18 131/73   12/14/17 149/84    Weight from Last 3 Encounters:   06/21/18 116.1 kg (256 lb)   06/20/18 116.1 kg (256 lb)   12/14/17 114.6 kg (252 lb 9.6 oz)              Today, you had the following     No orders found for display       Primary Care Provider Office Phone # Fax #    Chay Rudd [phone] [id number] [facility] [address]        Equal Access to Services     [facility] : Hadii aad ku hadasho Soedilberto, waaxda luqadaha, qaybta kaalmada yadira hare. So [facility] [phone].    ATENCIÓN: Si habla español, tiene a french disposición servicios gratuitos de asistencia lingüística. Llame al [phone].    We comply with applicable federal civil  rights laws and [place] laws. We do not discriminate on the basis of race, color, national origin, age, disability, sex, sexual orientation, or gender identity.            Thank you!     [facility]  [facility]. Our goal is always to provide you with excellent care. Hearing back from our patients is one way we can continue to improve our services. Please take a few minutes to complete the written survey that you may receive in the mail after your visit with us. Thank you!             Your Updated Medication List - Protect others around you: Learn how to safely use, store and throw away your medicines at www.disposemymeds.org.          This list is accurate as of 6/21/18 11:40 AM.  Always use your most recent med list.                   Brand Name Dispense Instructions for use Diagnosis    acyclovir 400 MG tablet    ZOVIRAX    90 tablet    Take 1 tablet (400 mg) by mouth daily    Herpes simplex type 2 infection       amLODIPine 10 MG tablet    NORVASC    90 tablet    TAKE 1 TABLET(10 MG) BY MOUTH DAILY    Hypertension goal BP (blood pressure) < 140/90, Essential hypertension, Hypothyroidism, unspecified type, Gastroesophageal reflux disease with esophagitis       aspirin 325 MG EC tablet      1 TABLET DAILY        atenolol 50 MG tablet    TENORMIN    90 tablet    TAKE 1 TABLET(50 MG) BY MOUTH DAILY    Essential hypertension, Hypertension goal BP (blood pressure) < 140/90, Hypothyroidism, unspecified type, Gastroesophageal reflux disease with esophagitis       CENTRUM SILVER per tablet      Take 1 tablet by mouth daily        diclofenac 1 % Gel topical gel    VOLTAREN    100 g    Apply 4 grams to knees or 2 grams to hands four times daily using enclosed dosing card.    Primary osteoarthritis of right hand       fluticasone 50 MCG/ACT spray    FLONASE    16 g    Spray 1-2 sprays into both nostrils daily    Nasal congestion       levothyroxine 150 MCG tablet    LEVOXYL    90  tablet    Take 1 tablet (150 mcg) by mouth daily    Hypothyroidism, unspecified type, Hypertension goal BP (blood pressure) < 140/90, Essential hypertension, Gastroesophageal reflux disease with esophagitis       omeprazole 20 MG CR capsule    priLOSEC    90 capsule    Take 1 capsule (20 mg) by mouth daily    Gastroesophageal reflux disease with esophagitis, Hypertension goal BP (blood pressure) < 140/90, Essential hypertension, Hypothyroidism, unspecified type       * order for DME     1 Units    Needs CPAP supplies  He continue to use and benefit from the CPAP 780.57C Sleep apnea    WILBERTO (obstructive sleep apnea)       * order for DME     1 Units    Equipment being ordered: CPAP mask and supplies    WILBERTO (obstructive sleep apnea)       order for DME     1 Units    Equipment being ordered: CPAP mask and supplies    WILBERTO (obstructive sleep apnea)       * Notice:  This list has 2 medication(s) that are the same as other medications prescribed for you. Read the directions carefully, and ask your doctor or other care provider to review them with you.

## 2018-06-21 NOTE — PROGRESS NOTES
SUBJECTIVE:   Mamadou Pena is a 72 year old male who is seen in consultation at the request of Dr. Chay Rudd for evaluation of right knee pain that has been present for approximately 1 year. The patient had a fall approximately 1 year ago when he missed a step but pain has worsened over the last few months. He has primarily had stiffness and locking in both extension and flexion. He has had a previous meniscus tear in his left knee but he reports his current pain is different from that pain.    Present symptoms: pain anteriorly, medially, and posteriorly and stiffness in extension and flexion. No swelling.  Symptoms occur when: holding the leg in one position, flexion or extension. Intermittent pain during squatting, pivoting, and kneeling    Treatments tried to this point: acetaminophen    Orthopedic PMH: History of left knee arthroscopy in the past.    Review of Systems:  Constitutional:  NEGATIVE for fever, chills, change in weight  Integumentary/Skin:  NEGATIVE for worrisome rashes, moles or lesions  Eyes:  NEGATIVE for vision changes or irritation  ENT/Mouth:  NEGATIVE for ear, mouth and throat problems  Resp:  NEGATIVE for significant cough or SOB  Breast:  NEGATIVE for masses, tenderness or discharge  CV:  NEGATIVE for chest pain, palpitations or peripheral edema  GI:  NEGATIVE for nausea, abdominal pain, heartburn, or change in bowel habits  :  Negative   Musculoskeletal:  See HPI above  Neuro:  NEGATIVE for weakness, dizziness or paresthesias  Endocrine:  NEGATIVE for temperature intolerance, skin/hair changes  Heme/allergy/immune:  NEGATIVE for bleeding problems  Psychiatric:  NEGATIVE for changes in mood or affect    Past Medical History:   Past Medical History:   Diagnosis Date     Allergic rhinitis      ED (erectile dysfunction)      GERD (gastroesophageal reflux disease)      Hypothyroid      Sleep apnea      Unspecified essential hypertension      Past Surgical History:   Past Surgical  History:   Procedure Laterality Date     C ANESTH,DX ARTHROSCOPIC PROC KNEE JOINT  9/2006    L     CARPAL TUNNEL RELEASE RT/LT       COLONOSCOPY       TONSILLECTOMY  1950     VASECTOMY  1980     Family History:   Family History   Problem Relation Age of Onset     Hypertension Mother      HEART DISEASE Father      Hypertension Paternal Grandfather      Asthma Daughter      Social History:   Social History   Substance Use Topics     Smoking status: Former Smoker     Quit date: 2/8/1971     Smokeless tobacco: Never Used     Alcohol use 1.0 - 1.5 oz/week     2 - 3 Standard drinks or equivalent per week     OBJECTIVE:  Physical Exam:  There were no vitals taken for this visit.  General Appearance: healthy, alert and no distress   Skin: no suspicious lesions or rashes  Neuro: Normal strength and tone, mentation intact and speech normal  Vascular: good pulses, and cappillary refill   Lymph: no lymphadenopathy   Psych:  mentation appears normal and affect normal/bright  Resp: no increased work of breathing     Right Knee Exam:  Gait: walks with a slightly antalgic gait favoring the right side  Alignment: normal   Squat: 80% limited by pain.    Patellofemoral joint: moderate crepitations in the patellofemoral joint.  Effusion: mild  ROM: 0-120* degrees  Tender: medial joint line and lateral facet of the patella  Ligaments: Lachman's Stable, Anterior and posterior drawer stable, stable to varus and valgus stress. No pain with Varus/Valgus stress testing.    McMurrays: negative  No pain with hip ROM    X-rays:  Obtained today, 06/21/18, of the RIGHT KNEE: 3-views, reviewed in the office with the patient by myself today and show no change from the x-rays from 03/29/17. There is good joint spacing medially and laterally and some mild degenerative changes in the patellofemoral joint. There is also a linear calcification in the suprapatellar pouch in the identical location to the x-rays from 1 year ago.    ASSESSMENT:   1. Mild OA  mainly in the patellofemoral joint, right knee  2. Possible medial meniscus tear, right knee    PLAN:   We talked about the options: MRI, corticosteroid injection, and PT. The patient declined physical therapy as he will work on strengthening exercises on his own at the gym. The patient decided to monitor his symptoms and will consider an MRI or steroid injection if his knee becomes more painful. In the meantime, NSAIDs and icing as needed. All questions were answered.    Return to clinic: FLOYD Valles MD  Dept. Orthopedic Surgery  [facility]     This document serves as a record of the services and decisions personally performed and made by Dr. OMAR Valles MD. It was created on his behalf by Vinod Keith, a trained medical scribe. The creation of this record is based on the provider's personal observations and the statements of the patient. This document has been checked and approved by the attending provider.   Vinod Keith June 21, 2018 11:39 AM

## 2018-06-21 NOTE — LETTER
6/21/2018         RE: Mamadou Pena  [address]  [place] [address]        Dear Colleague,    Thank you for referring your patient, Mamadou Pena, to the [facility]. Please see a copy of my visit note below.    SUBJECTIVE:   Mamadou Pena is a 72 year old male who is seen in consultation at the request of Dr. Chay Rudd for evaluation of right knee pain that has been present for approximately 1 year. The patient had a fall approximately 1 year ago when he missed a step but pain has worsened over the last few months. He has primarily had stiffness and locking in both extension and flexion. He has had a previous meniscus tear in his left knee but he reports his current pain is different from that pain.    Present symptoms: pain anteriorly, medially, and posteriorly and stiffness in extension and flexion. No swelling.  Symptoms occur when: holding the leg in one position, flexion or extension. Intermittent pain during squatting, pivoting, and kneeling    Treatments tried to this point: acetaminophen    Orthopedic PMH: History of left knee arthroscopy in the past.    Review of Systems:  Constitutional:  NEGATIVE for fever, chills, change in weight  Integumentary/Skin:  NEGATIVE for worrisome rashes, moles or lesions  Eyes:  NEGATIVE for vision changes or irritation  ENT/Mouth:  NEGATIVE for ear, mouth and throat problems  Resp:  NEGATIVE for significant cough or SOB  Breast:  NEGATIVE for masses, tenderness or discharge  CV:  NEGATIVE for chest pain, palpitations or peripheral edema  GI:  NEGATIVE for nausea, abdominal pain, heartburn, or change in bowel habits  :  Negative   Musculoskeletal:  See HPI above  Neuro:  NEGATIVE for weakness, dizziness or paresthesias  Endocrine:  NEGATIVE for temperature intolerance, skin/hair changes  Heme/allergy/immune:  NEGATIVE for bleeding problems  Psychiatric:  NEGATIVE for changes in mood or affect    Past Medical History:   Past  Medical History:   Diagnosis Date     Allergic rhinitis      ED (erectile dysfunction)      GERD (gastroesophageal reflux disease)      Hypothyroid      Sleep apnea      Unspecified essential hypertension      Past Surgical History:   Past Surgical History:   Procedure Laterality Date     C ANESTH,DX ARTHROSCOPIC PROC KNEE JOINT  9/2006    L     CARPAL TUNNEL RELEASE RT/LT       COLONOSCOPY       TONSILLECTOMY  1950     VASECTOMY  1980     Family History:   Family History   Problem Relation Age of Onset     Hypertension Mother      HEART DISEASE Father      Hypertension Paternal Grandfather      Asthma Daughter      Social History:   Social History   Substance Use Topics     Smoking status: Former Smoker     Quit date: 2/8/1971     Smokeless tobacco: Never Used     Alcohol use 1.0 - 1.5 oz/week     2 - 3 Standard drinks or equivalent per week     OBJECTIVE:  Physical Exam:  There were no vitals taken for this visit.  General Appearance: healthy, alert and no distress   Skin: no suspicious lesions or rashes  Neuro: Normal strength and tone, mentation intact and speech normal  Vascular: good pulses, and cappillary refill   Lymph: no lymphadenopathy   Psych:  mentation appears normal and affect normal/bright  Resp: no increased work of breathing     Right Knee Exam:  Gait: walks with a slightly antalgic gait favoring the right side  Alignment: normal   Squat: 80% limited by pain.    Patellofemoral joint: moderate crepitations in the patellofemoral joint.  Effusion: mild  ROM: 0-120* degrees  Tender: medial joint line and lateral facet of the patella  Ligaments: Lachman's Stable, Anterior and posterior drawer stable, stable to varus and valgus stress. No pain with Varus/Valgus stress testing.    McMurrays: negative  No pain with hip ROM    X-rays:  Obtained today, 06/21/18, of the RIGHT KNEE: 3-views, reviewed in the office with the patient by myself today and show no change from the x-rays from 03/29/17. There is good  joint spacing medially and laterally and some mild degenerative changes in the patellofemoral joint. There is also a linear calcification in the suprapatellar pouch in the identical location to the x-rays from 1 year ago.    ASSESSMENT:   1. Mild OA mainly in the patellofemoral joint, right knee  2. Possible medial meniscus tear, right knee    PLAN:   We talked about the options: MRI, corticosteroid injection, and PT. The patient declined physical therapy as he will work on strengthening exercises on his own at the gym. The patient decided to monitor his symptoms and will consider an MRI or steroid injection if his knee becomes more painful. In the meantime, NSAIDs and icing as needed. All questions were answered.    Return to clinic: FLOYD Valles MD  Dept. Orthopedic Surgery  [facility]     This document serves as a record of the services and decisions personally performed and made by Dr. OMAR Valles MD. It was created on his behalf by Vinod Keith, a trained medical scribe. The creation of this record is based on the provider's personal observations and the statements of the patient. This document has been checked and approved by the attending provider.   Vinod Keith June 21, 2018 11:39 AM    Again, thank you for allowing me to participate in the care of your patient.        Sincerely,        Otilio Valles MD

## 2018-06-21 NOTE — PATIENT INSTRUCTIONS
Non-surgical treatment for knee arthritis includes:    * rest.  For acute flares. (Periodic).    * Activity modification - avoid impact activities or activities that aggravate symptoms.   Keeping joints moving may be the most important aspect of joint health.    * Tylenol as needed for pain, consider Tylenol arthritis or similar.  (no more than 3000mg of acetaminophen in a 24 hour period)    * NSAIDS (non-steroidal anti-inflammatory medications; e.g. Aleve, advil, motrin, ibuprofen, etc) - regular use for inflammation ( twice daily or three times daily), with food, as long as there are no contra-indications to using these medications. (if you have stomach ulcers, reflux, or kidney dysfunction, you should talk to your primary care provider to discuss whether these medications are right for your).  Please discuss other concerns with your primary care doctor if needed.    *Glucosamine-Chondroitin (1500 mg per day. Available over the counter)  has not been proven to help arthritis, yet some patients claim that it does help them with their arthritis pain.    * ice, 15-20 minutes at a time several times a day or as needed if there is swelling, or after an acute injury.  * Strengthening of quadriceps muscles  * Physical Therapy for strengthening, stretching and range of motion exercises of legs    * Weight loss: Your body mass index is 35.7 kg/(m^2). A healthy BMI is below 25.  Weight loss benefits, not only the current pain symptoms, but also overall health. Recommend a good diet plan that works for the patient, with the assistance of a dietician or primary care doctor as needed. Also, a good, low-impact exercise program for at least 20 minutes per day, 3 times per week, such as exercise bike, elliptical , or pool.    *DIET:  Although there is no set diet that will eliminate/cure arthritis, there are some foods that may help inflammation, which is the cause of the pain in arthritis, such as concentrated cherry  "juice, Tumeric, and Fish Oil.     Check out the Arthritis Foundation website for further diet suggestions to potentially reduce inflammation and pain:    http://www.arthritis.org/living-with-arthritis/arthritis-diet/best-foods-for-arthritis/    * Exercise: low impact such as stationary bike, elliptical, pool.  Some Knee exercises can be found at the Orthoinfo website:  https://orthoinfo.aaos.org/en/staying-healthy/knee-exercises/     * Injections: cortisone, versus viscosupplementation (hyaluronic acid, \"rooster comb\", \"gel shots\")  * Bracing: bracing the knee may offer some relief of symptoms when worn and provide some stability.  These can be over the counter, or custom-made \"\" braces that take the pressure off of the worn portion of the knee.  * over the counter supplements such as glucosamine-chondroitin sulfate may help with joint pain.  * topical ointments may help as well with pain    *Accupuncture may be helpful to control the pain.    *Surgical options include arthroscopy, osteotomy (correcting the alignment of the bone by cutting it), biologic resurfacing, cadaver partial transplant, partial or total knee replacement.  This should be discussed with Dr. Valles.  "

## 2018-06-26 ENCOUNTER — SURGERY (OUTPATIENT)
Age: 73
End: 2018-06-26

## 2018-06-26 ENCOUNTER — HOSPITAL ENCOUNTER (OUTPATIENT)
Facility: AMBULATORY SURGERY CENTER | Age: 73
Discharge: HOME | End: 2018-06-26
Attending: SURGERY | Admitting: SURGERY
Payer: MEDICARE

## 2018-06-26 VITALS
HEART RATE: 57 BPM | SYSTOLIC BLOOD PRESSURE: 106 MMHG | TEMPERATURE: 97.2 F | DIASTOLIC BLOOD PRESSURE: 63 MMHG | OXYGEN SATURATION: 94 % | RESPIRATION RATE: 16 BRPM

## 2018-06-26 LAB — UPPER GI ENDOSCOPY: NORMAL

## 2018-06-26 PROCEDURE — G8907 PT DOC NO EVENTS ON DISCHARG: HCPCS

## 2018-06-26 PROCEDURE — 43239 EGD BIOPSY SINGLE/MULTIPLE: CPT

## 2018-06-26 PROCEDURE — G8918 PT W/O PREOP ORDER IV AB PRO: HCPCS

## 2018-06-26 RX ORDER — ONDANSETRON 2 MG/ML
4 INJECTION INTRAMUSCULAR; INTRAVENOUS
Status: DISCONTINUED | OUTPATIENT
Start: 2018-06-26 | End: 2018-06-27 | Stop reason: HOSPADM

## 2018-06-26 RX ORDER — FENTANYL CITRATE 50 UG/ML
INJECTION, SOLUTION INTRAMUSCULAR; INTRAVENOUS PRN
Status: DISCONTINUED | OUTPATIENT
Start: 2018-06-26 | End: 2018-06-26 | Stop reason: HOSPADM

## 2018-06-26 RX ORDER — LIDOCAINE 40 MG/G
CREAM TOPICAL
Status: DISCONTINUED | OUTPATIENT
Start: 2018-06-26 | End: 2018-06-27 | Stop reason: HOSPADM

## 2018-06-26 RX ADMIN — FENTANYL CITRATE 100 MCG: 50 INJECTION, SOLUTION INTRAMUSCULAR; INTRAVENOUS at 11:04

## 2018-06-26 RX ADMIN — FENTANYL CITRATE 50 MCG: 50 INJECTION, SOLUTION INTRAMUSCULAR; INTRAVENOUS at 11:07

## 2018-06-29 LAB — COPATH REPORT: NORMAL

## 2018-10-01 DIAGNOSIS — I10 ESSENTIAL HYPERTENSION: ICD-10-CM

## 2018-10-01 DIAGNOSIS — E03.9 HYPOTHYROIDISM, UNSPECIFIED TYPE: ICD-10-CM

## 2018-10-01 DIAGNOSIS — K21.00 GASTROESOPHAGEAL REFLUX DISEASE WITH ESOPHAGITIS: ICD-10-CM

## 2018-10-01 DIAGNOSIS — I10 HYPERTENSION GOAL BP (BLOOD PRESSURE) < 140/90: ICD-10-CM

## 2018-10-03 RX ORDER — AMLODIPINE BESYLATE 10 MG/1
TABLET ORAL
Qty: 90 TABLET | Refills: 2 | Status: SHIPPED | OUTPATIENT
Start: 2018-10-03 | End: 2019-07-02

## 2019-01-31 ENCOUNTER — TELEPHONE (OUTPATIENT)
Dept: FAMILY MEDICINE | Facility: CLINIC | Age: 74
End: 2019-01-31

## 2019-01-31 DIAGNOSIS — K21.00 GASTROESOPHAGEAL REFLUX DISEASE WITH ESOPHAGITIS: ICD-10-CM

## 2019-01-31 DIAGNOSIS — I10 ESSENTIAL HYPERTENSION: ICD-10-CM

## 2019-01-31 DIAGNOSIS — E03.9 HYPOTHYROIDISM, UNSPECIFIED TYPE: ICD-10-CM

## 2019-01-31 DIAGNOSIS — I10 HYPERTENSION GOAL BP (BLOOD PRESSURE) < 140/90: ICD-10-CM

## 2019-01-31 RX ORDER — LEVOTHYROXINE SODIUM 150 UG/1
150 TABLET ORAL DAILY
Qty: 90 TABLET | Refills: 1 | Status: SHIPPED | OUTPATIENT
Start: 2019-01-31 | End: 2019-07-16

## 2019-01-31 NOTE — TELEPHONE ENCOUNTER
Patient is calling to refill his levothyroxine (LEVOXYL) 150 MCG tablet.  He wants all of his RX;s to be switched over to the [facility] in [place].  Thank you

## 2019-06-29 DIAGNOSIS — I10 HYPERTENSION GOAL BP (BLOOD PRESSURE) < 140/90: ICD-10-CM

## 2019-06-29 DIAGNOSIS — I10 ESSENTIAL HYPERTENSION: ICD-10-CM

## 2019-06-29 DIAGNOSIS — E03.9 HYPOTHYROIDISM, UNSPECIFIED TYPE: ICD-10-CM

## 2019-06-29 DIAGNOSIS — K21.00 GASTROESOPHAGEAL REFLUX DISEASE WITH ESOPHAGITIS: ICD-10-CM

## 2019-07-01 RX ORDER — AMLODIPINE BESYLATE 10 MG/1
TABLET ORAL
Qty: 90 TABLET | Refills: 0 | OUTPATIENT
Start: 2019-07-01

## 2019-07-01 NOTE — TELEPHONE ENCOUNTER
"Requested Prescriptions   Pending Prescriptions Disp Refills     amLODIPine (NORVASC) 10 MG tablet [Pharmacy Med Name: AMLODIPINE BESYLATE 10 MG TAB] 90 tablet 0     Sig: TAKE 1 TABLET BY MOUTH EVERY DAY       Calcium Channel Blockers Protocol  Failed - 6/29/2019  7:38 AM        Failed - Blood pressure under 140/90 in past 12 months     BP Readings from Last 3 Encounters:   06/26/18 106/63   06/21/18 151/77   06/20/18 131/73                 Failed - Recent (12 mo) or future (30 days) visit within the authorizing provider's specialty     Patient had office visit in the last 12 months or has a visit in the next 30 days with authorizing provider or within the authorizing provider's specialty.  See \"Patient Info\" tab in inbasket, or \"Choose Columns\" in Meds & Orders section of the refill encounter.              Failed - Normal serum creatinine on file in past 12 months     Recent Labs   Lab Test 06/20/18  1523   CR 1.19          "

## 2019-07-02 RX ORDER — AMLODIPINE BESYLATE 10 MG/1
10 TABLET ORAL DAILY
Qty: 30 TABLET | Refills: 0 | Status: SHIPPED | OUTPATIENT
Start: 2019-07-02 | End: 2019-07-16

## 2019-07-02 NOTE — TELEPHONE ENCOUNTER
RN please refill medication until seen, has 4 pills left only. Appointments on 07/10/19 for Labs and Wellness 07/17/19.  DAYANA Basurto

## 2019-07-03 ENCOUNTER — DOCUMENTATION ONLY (OUTPATIENT)
Dept: FAMILY MEDICINE | Facility: CLINIC | Age: 74
End: 2019-07-03

## 2019-07-03 DIAGNOSIS — Z13.6 CARDIOVASCULAR SCREENING; LDL GOAL LESS THAN 130: ICD-10-CM

## 2019-07-03 DIAGNOSIS — I10 HYPERTENSION GOAL BP (BLOOD PRESSURE) < 140/90: ICD-10-CM

## 2019-07-03 DIAGNOSIS — Z00.00 ROUTINE HISTORY AND PHYSICAL EXAMINATION OF ADULT: ICD-10-CM

## 2019-07-03 DIAGNOSIS — E78.00 HIGH CHOLESTEROL: ICD-10-CM

## 2019-07-03 DIAGNOSIS — E03.9 HYPOTHYROIDISM, UNSPECIFIED TYPE: Primary | ICD-10-CM

## 2019-07-03 NOTE — PROGRESS NOTES
Please review lab orders sign and close encounter. Michelle Moy MA/DAYANA    Wellness and hypertension appt 7/16/19

## 2019-07-03 NOTE — PROGRESS NOTES
.Please place or confirm pending lab orders for upcoming lab appointment on 7/10/19  Thank you,  Latoya

## 2019-07-04 DIAGNOSIS — K21.00 GASTROESOPHAGEAL REFLUX DISEASE WITH ESOPHAGITIS: ICD-10-CM

## 2019-07-04 DIAGNOSIS — I10 ESSENTIAL HYPERTENSION: ICD-10-CM

## 2019-07-04 DIAGNOSIS — E03.9 HYPOTHYROIDISM, UNSPECIFIED TYPE: ICD-10-CM

## 2019-07-04 DIAGNOSIS — I10 HYPERTENSION GOAL BP (BLOOD PRESSURE) < 140/90: ICD-10-CM

## 2019-07-08 RX ORDER — ATENOLOL 50 MG/1
TABLET ORAL
Qty: 30 TABLET | Refills: 0 | Status: SHIPPED | OUTPATIENT
Start: 2019-07-08 | End: 2019-07-16

## 2019-07-08 NOTE — TELEPHONE ENCOUNTER
Pending apt with lab and provider. Prescription approved per [facility] Refill Protocol. Kristina Ybarra RN

## 2019-07-10 DIAGNOSIS — E03.9 HYPOTHYROIDISM, UNSPECIFIED TYPE: ICD-10-CM

## 2019-07-10 DIAGNOSIS — Z13.6 CARDIOVASCULAR SCREENING; LDL GOAL LESS THAN 130: ICD-10-CM

## 2019-07-10 DIAGNOSIS — E78.00 HIGH CHOLESTEROL: ICD-10-CM

## 2019-07-10 DIAGNOSIS — I10 HYPERTENSION GOAL BP (BLOOD PRESSURE) < 140/90: ICD-10-CM

## 2019-07-10 DIAGNOSIS — Z00.00 ROUTINE HISTORY AND PHYSICAL EXAMINATION OF ADULT: ICD-10-CM

## 2019-07-10 LAB
ANION GAP SERPL CALCULATED.3IONS-SCNC: 4 MMOL/L (ref 3–14)
BUN SERPL-MCNC: 15 MG/DL (ref 7–30)
CALCIUM SERPL-MCNC: 8.6 MG/DL (ref 8.5–10.1)
CHLORIDE SERPL-SCNC: 107 MMOL/L (ref 94–109)
CHOLEST SERPL-MCNC: 160 MG/DL
CO2 SERPL-SCNC: 30 MMOL/L (ref 20–32)
CREAT SERPL-MCNC: 1.1 MG/DL (ref 0.66–1.25)
GFR SERPL CREATININE-BSD FRML MDRD: 66 ML/MIN/{1.73_M2}
GLUCOSE SERPL-MCNC: 120 MG/DL (ref 70–99)
HDLC SERPL-MCNC: 33 MG/DL
LDLC SERPL CALC-MCNC: 64 MG/DL
NONHDLC SERPL-MCNC: 127 MG/DL
POTASSIUM SERPL-SCNC: 4.1 MMOL/L (ref 3.4–5.3)
SODIUM SERPL-SCNC: 141 MMOL/L (ref 133–144)
T4 FREE SERPL-MCNC: 1.3 NG/DL (ref 0.76–1.46)
TRIGL SERPL-MCNC: 313 MG/DL
TSH SERPL DL<=0.005 MIU/L-ACNC: 0.28 MU/L (ref 0.4–4)

## 2019-07-10 PROCEDURE — 80048 BASIC METABOLIC PNL TOTAL CA: CPT | Performed by: FAMILY MEDICINE

## 2019-07-10 PROCEDURE — 84439 ASSAY OF FREE THYROXINE: CPT | Performed by: FAMILY MEDICINE

## 2019-07-10 PROCEDURE — 84443 ASSAY THYROID STIM HORMONE: CPT | Performed by: FAMILY MEDICINE

## 2019-07-10 PROCEDURE — 36415 COLL VENOUS BLD VENIPUNCTURE: CPT | Performed by: FAMILY MEDICINE

## 2019-07-10 PROCEDURE — 80061 LIPID PANEL: CPT | Performed by: FAMILY MEDICINE

## 2019-07-16 ENCOUNTER — OFFICE VISIT (OUTPATIENT)
Dept: FAMILY MEDICINE | Facility: CLINIC | Age: 74
End: 2019-07-16
Payer: MEDICARE

## 2019-07-16 VITALS
TEMPERATURE: 98.4 F | WEIGHT: 255 LBS | BODY MASS INDEX: 35.7 KG/M2 | RESPIRATION RATE: 18 BRPM | SYSTOLIC BLOOD PRESSURE: 133 MMHG | OXYGEN SATURATION: 98 % | HEIGHT: 71 IN | HEART RATE: 65 BPM | DIASTOLIC BLOOD PRESSURE: 68 MMHG

## 2019-07-16 DIAGNOSIS — B00.9 HERPES SIMPLEX TYPE 2 INFECTION: ICD-10-CM

## 2019-07-16 DIAGNOSIS — E03.9 HYPOTHYROIDISM, UNSPECIFIED TYPE: ICD-10-CM

## 2019-07-16 DIAGNOSIS — K21.00 GASTROESOPHAGEAL REFLUX DISEASE WITH ESOPHAGITIS: ICD-10-CM

## 2019-07-16 DIAGNOSIS — I10 HYPERTENSION GOAL BP (BLOOD PRESSURE) < 140/90: ICD-10-CM

## 2019-07-16 DIAGNOSIS — I10 ESSENTIAL HYPERTENSION: ICD-10-CM

## 2019-07-16 DIAGNOSIS — Z00.00 ROUTINE GENERAL MEDICAL EXAMINATION AT A HEALTH CARE FACILITY: Primary | ICD-10-CM

## 2019-07-16 PROCEDURE — G0439 PPPS, SUBSEQ VISIT: HCPCS | Performed by: FAMILY MEDICINE

## 2019-07-16 RX ORDER — AMLODIPINE BESYLATE 10 MG/1
10 TABLET ORAL DAILY
Qty: 90 TABLET | Refills: 1 | Status: SHIPPED | OUTPATIENT
Start: 2019-07-16 | End: 2019-12-19

## 2019-07-16 RX ORDER — ATENOLOL 50 MG/1
TABLET ORAL
Qty: 90 TABLET | Refills: 1 | Status: SHIPPED | OUTPATIENT
Start: 2019-07-16 | End: 2020-04-09

## 2019-07-16 RX ORDER — ACYCLOVIR 400 MG/1
400 TABLET ORAL DAILY
Qty: 90 TABLET | Refills: 3 | Status: SHIPPED | OUTPATIENT
Start: 2019-07-16

## 2019-07-16 RX ORDER — LEVOTHYROXINE SODIUM 150 UG/1
150 TABLET ORAL DAILY
Qty: 90 TABLET | Refills: 1 | Status: SHIPPED | OUTPATIENT
Start: 2019-07-16 | End: 2019-12-19

## 2019-07-16 ASSESSMENT — ENCOUNTER SYMPTOMS
MYALGIAS: 0
HEMATOCHEZIA: 0
NERVOUS/ANXIOUS: 0
FEVER: 0
HEADACHES: 0
HEARTBURN: 0
FREQUENCY: 0
COUGH: 0
HEMATURIA: 0
ABDOMINAL PAIN: 0
CONSTIPATION: 0
SHORTNESS OF BREATH: 0
DIARRHEA: 0
JOINT SWELLING: 0
NAUSEA: 0
DIZZINESS: 0
PARESTHESIAS: 0
EYE PAIN: 0
PALPITATIONS: 0
CHILLS: 0
ARTHRALGIAS: 1
DYSURIA: 0
WEAKNESS: 0
SORE THROAT: 0

## 2019-07-16 ASSESSMENT — PAIN SCALES - GENERAL: PAINLEVEL: NO PAIN (0)

## 2019-07-16 ASSESSMENT — MIFFLIN-ST. JEOR: SCORE: 1923.8

## 2019-07-16 ASSESSMENT — ACTIVITIES OF DAILY LIVING (ADL): CURRENT_FUNCTION: NO ASSISTANCE NEEDED

## 2019-07-16 NOTE — NURSING NOTE
"Chief Complaint   Patient presents with     Wellness Visit       Initial /68   Pulse 65   Temp 98.4  F (36.9  C) (Oral)   Resp 18   Ht 1.803 m (5' 11\")   Wt 115.7 kg (255 lb)   SpO2 98%   BMI 35.57 kg/m   Estimated body mass index is 35.57 kg/m  as calculated from the following:    Height as of this encounter: 1.803 m (5' 11\").    Weight as of this encounter: 115.7 kg (255 lb).  Medication Reconciliation: complete  Rae Torres M.A.    "

## 2019-07-16 NOTE — PROGRESS NOTES
"SUBJECTIVE:   Mamadou Pena is a 73 year old male who presents for Preventive Visit.  click delete button to remove this line now    Are you in the first 12 months of your Medicare coverage?  No    Healthy Habits:     In general, how would you rate your overall health?  Good    Frequency of exercise:  2-3 days/week    Duration of exercise:  30-45 minutes    Do you usually eat at least 4 servings of fruit and vegetables a day, include whole grains    & fiber and avoid regularly eating high fat or \"junk\" foods?  Yes    Taking medications regularly:  Yes    Medication side effects:  None    Ability to successfully perform activities of daily living:  No assistance needed    Home Safety:  No safety concerns identified    Hearing Impairment:  Difficulty following a conversation in a noisy restaurant or crowded room, feel that people are mumbling or not speaking clearly, difficulty following dialogue in the theater, difficult to understand a speaker at a public meeting or Orthodox service, need to ask people to speak up or repeat themselves, find that men's voices are easier to understand than woman's, difficulty understanding soft or whispered speech and difficulty understanding speech on the telephone    In the past 6 months, have you been bothered by leaking of urine?  No    In general, how would you rate your overall mental or emotional health?  Good      PHQ-2 Total Score: 0    Additional concerns today:  No    Do you feel safe in your environment? Yes    Do you have a Health Care Directive? Yes: Advance Directive has been received and scanned.      Fall risk  Fallen 2 or more times in the past year?: No  Any fall with injury in the past year?: No    Cognitive Screening   1) Repeat 3 items (Leader, Season, Table)    2) Clock draw: NORMAL  3) 3 item recall: Recalls 3 objects  Results: NORMAL clock, 1-2 items recalled: COGNITIVE IMPAIRMENT LESS LIKELY    Mini-CogTM Copyright S Allie. Licensed by the author for use " in [facility]; reprinted with permission (soliam@[facility]). All rights reserved.      Do you have sleep apnea, excessive snoring or daytime drowsiness?: yes    Reviewed and updated as needed this visit by clinical staff  Tobacco  Allergies  Meds         Reviewed and updated as needed this visit by Provider        Social History     Tobacco Use     Smoking status: Former Smoker     Last attempt to quit: 1971     Years since quittin.4     Smokeless tobacco: Never Used   Substance Use Topics     Alcohol use: Yes     Alcohol/week: 1.0 - 1.5 oz     Types: 2 - 3 Standard drinks or equivalent per week         Alcohol Use 2019   Prescreen: >3 drinks/day or >7 drinks/week? No   Prescreen: >3 drinks/day or >7 drinks/week? -           SUBJECTIVE:  73 year oldyear old male enters with  hypertension.  Pt. Has been compliant with medications and medications were reviewed.  No side effects. No chest pain or sob. Low sodium diet.    Current Outpatient Medications:      acyclovir (ZOVIRAX) 400 MG tablet, Take 1 tablet (400 mg) by mouth daily, Disp: 90 tablet, Rfl: 3     amLODIPine (NORVASC) 10 MG tablet, Take 1 tablet (10 mg) by mouth daily, Disp: 90 tablet, Rfl: 1     ASPIRIN 325 MG OR TBEC, 1 TABLET DAILY, Disp: , Rfl:      atenolol (TENORMIN) 50 MG tablet, TAKE 1 TABLET BY MOUTH EVERY DAY, Disp: 90 tablet, Rfl: 1     diclofenac (VOLTAREN) 1 % GEL topical gel, Apply 4 grams to knees or 2 grams to hands four times daily using enclosed dosing card., Disp: 100 g, Rfl: 1     fluticasone (FLONASE) 50 MCG/ACT nasal spray, Spray 1-2 sprays into both nostrils daily, Disp: 16 g, Rfl: 5     levothyroxine (LEVOXYL) 150 MCG tablet, Take 1 tablet (150 mcg) by mouth daily, Disp: 90 tablet, Rfl: 1     Multiple Vitamins-Minerals (CENTRUM SILVER) per tablet, Take 1 tablet by mouth daily, Disp: , Rfl:      omeprazole (PRILOSEC) 20 MG DR capsule, Take 1 capsule (20 mg) by mouth daily, Disp: 90 capsule, Rfl: 3     order for  DME, Needs CPAP supplies  He continue to use and benefit from the CPAP 780.57C Sleep apnea, Disp: 1 Units, Rfl: 1     order for DME, Equipment being ordered: CPAP mask and supplies, Disp: 1 Units, Rfl: 1     order for DME, Equipment being ordered: CPAP mask and supplies, Disp: 1 Units, Rfl: 0  Past Medical History:   Diagnosis Date     Allergic rhinitis      ED (erectile dysfunction)      GERD (gastroesophageal reflux disease)      Hypothyroid      Sleep apnea      Unspecified essential hypertension      Orders Only on 07/10/2019   Component Date Value Ref Range Status     Cholesterol 07/10/2019 160  <200 mg/dL Final     Triglycerides 07/10/2019 313* <150 mg/dL Final    Comment: Borderline high:  150-199 mg/dl  High:             200-499 mg/dl  Very high:       >499 mg/dl       HDL Cholesterol 07/10/2019 33* >39 mg/dL Final     LDL Cholesterol Calculated 07/10/2019 64  <100 mg/dL Final    Desirable:       <100 mg/dl     Non HDL Cholesterol 07/10/2019 127  <130 mg/dL Final     Sodium 07/10/2019 141  133 - 144 mmol/L Final     Potassium 07/10/2019 4.1  3.4 - 5.3 mmol/L Final     Chloride 07/10/2019 107  94 - 109 mmol/L Final     Carbon Dioxide 07/10/2019 30  20 - 32 mmol/L Final     Anion Gap 07/10/2019 4  3 - 14 mmol/L Final     Glucose 07/10/2019 120* 70 - 99 mg/dL Final     Urea Nitrogen 07/10/2019 15  7 - 30 mg/dL Final     Creatinine 07/10/2019 1.10  0.66 - 1.25 mg/dL Final     GFR Estimate 07/10/2019 66  >60 mL/min/[1.73_m2] Final    Comment: Non  GFR Calc  Starting 12/18/2018, serum creatinine based estimated GFR (eGFR) will be   calculated using the Chronic Kidney Disease Epidemiology Collaboration   (CKD-EPI) equation.       GFR Estimate If Black 07/10/2019 76  >60 mL/min/[1.73_m2] Final    Comment:  GFR Calc  Starting 12/18/2018, serum creatinine based estimated GFR (eGFR) will be   calculated using the Chronic Kidney Disease Epidemiology Collaboration   (CKD-EPI) equation.    "    Calcium 07/10/2019 8.6  8.5 - 10.1 mg/dL Final     TSH 07/10/2019 0.28* 0.40 - 4.00 mU/L Final     T4 Free 07/10/2019 1.30  0.76 - 1.46 ng/dL Final      Reviewed health maintenance  Patient Active Problem List   Diagnosis     Hypothyroid     GERD (gastroesophageal reflux disease)     ED (erectile dysfunction)     Sleep apnea     CARDIOVASCULAR SCREENING; LDL GOAL LESS THAN 130     Hypertension goal BP (blood pressure) < 140/90     Advanced directives, counseling/discussion     Obesity     DDD (degenerative disc disease), lumbar     High cholesterol     Gastroesophageal reflux disease with esophagitis     WILBERTO (obstructive sleep apnea)     Primary osteoarthritis of right hand         OBJECTIVE:  no apparent distress  /68   Pulse 65   Temp 98.4  F (36.9  C) (Oral)   Resp 18   Ht 1.803 m (5' 11\")   Wt 115.7 kg (255 lb)   SpO2 98%   BMI 35.57 kg/m       Head: Normocephalic. No masses, lesions, tenderness or abnormalities.  Neck::Neck supple. No adenopathy. Thyroid symmetric, normal size.    Cardiovascular: negative. No lifts, heaves, or thrills. RRR. No murmurs, clicks gallops or rubs  Respiratory. Good diaphragmatic excursion. Lungs clear  Gastrointestinal:Abdomen soft, non-tender.  No masses, organomegaly    Orders Only on 07/10/2019   Component Date Value Ref Range Status     Cholesterol 07/10/2019 160  <200 mg/dL Final     Triglycerides 07/10/2019 313* <150 mg/dL Final    Comment: Borderline high:  150-199 mg/dl  High:             200-499 mg/dl  Very high:       >499 mg/dl       HDL Cholesterol 07/10/2019 33* >39 mg/dL Final     LDL Cholesterol Calculated 07/10/2019 64  <100 mg/dL Final    Desirable:       <100 mg/dl     Non HDL Cholesterol 07/10/2019 127  <130 mg/dL Final     Sodium 07/10/2019 141  133 - 144 mmol/L Final     Potassium 07/10/2019 4.1  3.4 - 5.3 mmol/L Final     Chloride 07/10/2019 107  94 - 109 mmol/L Final     Carbon Dioxide 07/10/2019 30  20 - 32 mmol/L Final     Anion Gap " 07/10/2019 4  3 - 14 mmol/L Final     Glucose 07/10/2019 120* 70 - 99 mg/dL Final     Urea Nitrogen 07/10/2019 15  7 - 30 mg/dL Final     Creatinine 07/10/2019 1.10  0.66 - 1.25 mg/dL Final     GFR Estimate 07/10/2019 66  >60 mL/min/[1.73_m2] Final    Comment: Non  GFR Calc  Starting 12/18/2018, serum creatinine based estimated GFR (eGFR) will be   calculated using the Chronic Kidney Disease Epidemiology Collaboration   (CKD-EPI) equation.       GFR Estimate If Black 07/10/2019 76  >60 mL/min/[1.73_m2] Final    Comment:  GFR Calc  Starting 12/18/2018, serum creatinine based estimated GFR (eGFR) will be   calculated using the Chronic Kidney Disease Epidemiology Collaboration   (CKD-EPI) equation.       Calcium 07/10/2019 8.6  8.5 - 10.1 mg/dL Final     TSH 07/10/2019 0.28* 0.40 - 4.00 mU/L Final     T4 Free 07/10/2019 1.30  0.76 - 1.46 ng/dL Final         ICD-10-CM    1. Routine general medical examination at a health care facility Z00.00    2. Hypertension goal BP (blood pressure) < 140/90 I10 amLODIPine (NORVASC) 10 MG tablet     atenolol (TENORMIN) 50 MG tablet     levothyroxine (LEVOXYL) 150 MCG tablet     omeprazole (PRILOSEC) 20 MG DR capsule   3. Essential hypertension I10 amLODIPine (NORVASC) 10 MG tablet     atenolol (TENORMIN) 50 MG tablet     levothyroxine (LEVOXYL) 150 MCG tablet     omeprazole (PRILOSEC) 20 MG DR capsule   4. Hypothyroidism, unspecified type E03.9 amLODIPine (NORVASC) 10 MG tablet     atenolol (TENORMIN) 50 MG tablet     levothyroxine (LEVOXYL) 150 MCG tablet     omeprazole (PRILOSEC) 20 MG DR capsule   5. Gastroesophageal reflux disease with esophagitis K21.0 amLODIPine (NORVASC) 10 MG tablet     atenolol (TENORMIN) 50 MG tablet     levothyroxine (LEVOXYL) 150 MCG tablet     omeprazole (PRILOSEC) 20 MG DR capsule   6. Herpes simplex type 2 infection B00.9 acyclovir (ZOVIRAX) 400 MG tablet    PLAN: Follow up in 6 months     SUBJECTIVE:  73 year old enters  "for thyroid. Pt. Has been compliant with medications and has no side effect. Denies temp intol no tremor or palpitations.  Patient Active Problem List   Diagnosis     Hypothyroid     GERD (gastroesophageal reflux disease)     ED (erectile dysfunction)     Sleep apnea     CARDIOVASCULAR SCREENING; LDL GOAL LESS THAN 130     Hypertension goal BP (blood pressure) < 140/90     Advanced directives, counseling/discussion     Obesity     DDD (degenerative disc disease), lumbar     High cholesterol     Gastroesophageal reflux disease with esophagitis     WILBERTO (obstructive sleep apnea)     Primary osteoarthritis of right hand     Reviewed health maintenance      OBJECTIVE:  Exam:  Constitutional: :\"healthy\",\"alert\",\"no distress\"}  Head: :\"Normocephalic. No masses, lesions, tenderness or abnormalities\"}  Neck:::\"Neck supple. No adenopathy. Thyroid symmetric, normal size,\"  Cardiovascular:\"negative\",\"PMI normal. No lifts, heaves, or thrills. RRR. No murmurs, clicks gallops or rub\"}  Respiratory: :\"negative\", Good diaphragmatic excursion. Lungs clear\"}  TSH   Date Value Ref Range Status   07/10/2019 0.28 (L) 0.40 - 4.00 mU/L Final   ]            Current providers sharing in care for this patient include:    Patient Care Team:  Chay Rudd MD as PCP - General  Chay Rudd MD as Assigned PCP    The following health maintenance items are reviewed in Epic and correct as of today:  Health Maintenance   Topic Date Due     ZOSTER IMMUNIZATION (1 of 2) 12/02/1995     MEDICARE ANNUAL WELLNESS VISIT  12/14/2018     FALL RISK ASSESSMENT  12/14/2018     PNEUMOCOCCAL IMMUNIZATION 65+ LOW/MEDIUM RISK (2 of 2 - PCV13) 12/14/2018     PHQ-2  01/01/2019     INFLUENZA VACCINE (1) 09/01/2019     TSH W/FREE T4 REFLEX  07/10/2020     COLONOSCOPY  11/15/2020     ADVANCE CARE PLANNING  12/14/2022     DTAP/TDAP/TD IMMUNIZATION (3 - Td) 11/13/2023     LIPID  07/10/2024     HEPATITIS C SCREENING  Completed     AORTIC ANEURYSM " "SCREENING (SYSTEM ASSIGNED)  Completed     IPV IMMUNIZATION  Aged Out     MENINGITIS IMMUNIZATION  Aged Out             Review of Systems   Constitutional: Negative for chills and fever.   HENT: Positive for hearing loss. Negative for congestion, ear pain and sore throat.    Eyes: Negative for pain and visual disturbance.   Respiratory: Negative for cough and shortness of breath.    Cardiovascular: Negative for chest pain, palpitations and peripheral edema.   Gastrointestinal: Negative for abdominal pain, constipation, diarrhea, heartburn, hematochezia and nausea.   Genitourinary: Positive for impotence and urgency. Negative for discharge, dysuria, frequency, genital sores and hematuria.   Musculoskeletal: Positive for arthralgias. Negative for joint swelling and myalgias.   Skin: Negative for rash.   Neurological: Negative for dizziness, weakness, headaches and paresthesias.   Psychiatric/Behavioral: Negative for mood changes. The patient is not nervous/anxious.          OBJECTIVE:   /68   Pulse 65   Temp 98.4  F (36.9  C) (Oral)   Resp 18   Ht 1.803 m (5' 11\")   Wt 115.7 kg (255 lb)   SpO2 98%   BMI 35.57 kg/m   Estimated body mass index is 35.57 kg/m  as calculated from the following:    Height as of this encounter: 1.803 m (5' 11\").    Weight as of this encounter: 115.7 kg (255 lb).  Physical Exam  GENERAL: healthy, alert and no distress  EYES: Eyes grossly normal to inspection, PERRL and conjunctivae and sclerae normal  HENT: ear canals and TM's normal, nose and mouth without ulcers or lesions  NECK: no adenopathy, no asymmetry, masses, or scars and thyroid normal to palpation  RESP: lungs clear to auscultation - no rales, rhonchi or wheezes  CV: regular rate and rhythm, normal S1 S2, no S3 or S4, no murmur, click or rub, no peripheral edema and peripheral pulses strong  ABDOMEN: soft, nontender, no hepatosplenomegaly, no masses and bowel sounds normal  MS: no gross musculoskeletal defects noted, " "no edema  SKIN: no suspicious lesions or rashes  NEURO: Normal strength and tone, mentation intact and speech normal  PSYCH: mentation appears normal, affect normal/bright    Diagnostic Test Results:  reviewed    ASSESSMENT / PLAN:       ICD-10-CM    1. Routine general medical examination at a health care facility Z00.00    2. Hypertension goal BP (blood pressure) < 140/90 I10 amLODIPine (NORVASC) 10 MG tablet     atenolol (TENORMIN) 50 MG tablet     levothyroxine (LEVOXYL) 150 MCG tablet     omeprazole (PRILOSEC) 20 MG DR capsule   3. Essential hypertension I10 amLODIPine (NORVASC) 10 MG tablet     atenolol (TENORMIN) 50 MG tablet     levothyroxine (LEVOXYL) 150 MCG tablet     omeprazole (PRILOSEC) 20 MG DR capsule   4. Hypothyroidism, unspecified type E03.9 amLODIPine (NORVASC) 10 MG tablet     atenolol (TENORMIN) 50 MG tablet     levothyroxine (LEVOXYL) 150 MCG tablet     omeprazole (PRILOSEC) 20 MG DR capsule   5. Gastroesophageal reflux disease with esophagitis K21.0 amLODIPine (NORVASC) 10 MG tablet     atenolol (TENORMIN) 50 MG tablet     levothyroxine (LEVOXYL) 150 MCG tablet     omeprazole (PRILOSEC) 20 MG DR capsule   6. Herpes simplex type 2 infection B00.9 acyclovir (ZOVIRAX) 400 MG tablet       End of Life Planning:  Patient currently has an advanced directive: Yes.  Practitioner is supportive of decision.    COUNSELING:  Reviewed preventive health counseling, as reflected in patient instructions       Regular exercise       Healthy diet/nutrition    Estimated body mass index is 35.57 kg/m  as calculated from the following:    Height as of this encounter: 1.803 m (5' 11\").    Weight as of this encounter: 115.7 kg (255 lb).    Weight management plan: less calories     reports that he quit smoking about 48 years ago. He has never used smokeless tobacco.      Appropriate preventive services were discussed with this patient, including applicable screening as appropriate for cardiovascular disease, diabetes, " osteopenia/osteoporosis, and glaucoma.  As appropriate for age/gender, discussed screening for colorectal cancer, prostate cancer, breast cancer, and cervical cancer. Checklist reviewing preventive services available has been given to the patient.    Reviewed patients plan of care and provided an AVS. The Basic Care Plan (routine screening as documented in Health Maintenance) for Mamadou meets the Care Plan requirement. This Care Plan has been established and reviewed with the Patient.    Counseling Resources:  ATP IV Guidelines  Pooled Cohorts Equation Calculator  Breast Cancer Risk Calculator  FRAX Risk Assessment  ICSI Preventive Guidelines  Dietary Guidelines for Americans, 2010  USDA's MyPlate  ASA Prophylaxis  Lung CA Screening    Chay Rudd MD  [facility]    Identified Health Risks:

## 2019-12-19 ENCOUNTER — TELEPHONE (OUTPATIENT)
Dept: FAMILY MEDICINE | Facility: CLINIC | Age: 74
End: 2019-12-19

## 2019-12-19 DIAGNOSIS — I10 HYPERTENSION GOAL BP (BLOOD PRESSURE) < 140/90: ICD-10-CM

## 2019-12-19 DIAGNOSIS — I10 ESSENTIAL HYPERTENSION: ICD-10-CM

## 2019-12-19 DIAGNOSIS — E03.9 HYPOTHYROIDISM, UNSPECIFIED TYPE: ICD-10-CM

## 2019-12-19 DIAGNOSIS — K21.00 GASTROESOPHAGEAL REFLUX DISEASE WITH ESOPHAGITIS: ICD-10-CM

## 2019-12-19 RX ORDER — AMLODIPINE BESYLATE 10 MG/1
10 TABLET ORAL DAILY
Qty: 90 TABLET | Refills: 0 | Status: SHIPPED | OUTPATIENT
Start: 2019-12-19 | End: 2020-03-20

## 2019-12-19 NOTE — TELEPHONE ENCOUNTER
Reason for Call:  Medication or medication refill:    Do you use a [facility]?  Name of the pharmacy and phone number for the current request:  [facility]    Name of the medication requested: levothyroxine, and amlodipine      Other request: patient will be going to [place] for three months would like a refill for these medications. Patient will be seen in April with new pcp       Can we leave a detailed message on this number? YES    Phone number patient can be reached at: Home number on file [phone] (home)    Best Time:     Call taken on 12/19/2019 at 12:03 PM by Shanae Hoover

## 2019-12-19 NOTE — TELEPHONE ENCOUNTER
To provider to advise a 3 month refill of Levothyroxine and Amlodipine  Patient leaving for [place] for 3 months, when returns seeing Dr. Burton to establish care in April      Abi KYLEN, RN, CPN

## 2019-12-20 RX ORDER — LEVOTHYROXINE SODIUM 150 UG/1
150 TABLET ORAL DAILY
Qty: 90 TABLET | Refills: 1 | Status: SHIPPED | OUTPATIENT
Start: 2019-12-20 | End: 2020-04-09

## 2020-02-23 ENCOUNTER — HEALTH MAINTENANCE LETTER (OUTPATIENT)
Age: 75
End: 2020-02-23

## 2020-03-18 DIAGNOSIS — I10 ESSENTIAL HYPERTENSION: ICD-10-CM

## 2020-03-18 DIAGNOSIS — E03.9 HYPOTHYROIDISM, UNSPECIFIED TYPE: ICD-10-CM

## 2020-03-18 DIAGNOSIS — K21.00 GASTROESOPHAGEAL REFLUX DISEASE WITH ESOPHAGITIS: ICD-10-CM

## 2020-03-18 DIAGNOSIS — I10 HYPERTENSION GOAL BP (BLOOD PRESSURE) < 140/90: ICD-10-CM

## 2020-03-20 RX ORDER — AMLODIPINE BESYLATE 10 MG/1
TABLET ORAL
Qty: 90 TABLET | Refills: 0 | Status: SHIPPED | OUTPATIENT
Start: 2020-03-20 | End: 2020-04-09

## 2020-04-09 ENCOUNTER — OFFICE VISIT (OUTPATIENT)
Dept: FAMILY MEDICINE | Facility: CLINIC | Age: 75
End: 2020-04-09
Payer: MEDICARE

## 2020-04-09 VITALS
WEIGHT: 260 LBS | BODY MASS INDEX: 36.4 KG/M2 | OXYGEN SATURATION: 98 % | HEIGHT: 71 IN | SYSTOLIC BLOOD PRESSURE: 132 MMHG | DIASTOLIC BLOOD PRESSURE: 78 MMHG | HEART RATE: 65 BPM | RESPIRATION RATE: 18 BRPM | TEMPERATURE: 98.3 F

## 2020-04-09 DIAGNOSIS — E03.9 HYPOTHYROIDISM, UNSPECIFIED TYPE: ICD-10-CM

## 2020-04-09 DIAGNOSIS — E78.00 HIGH CHOLESTEROL: ICD-10-CM

## 2020-04-09 DIAGNOSIS — I10 ESSENTIAL HYPERTENSION: Primary | ICD-10-CM

## 2020-04-09 LAB
ANION GAP SERPL CALCULATED.3IONS-SCNC: 4 MMOL/L (ref 3–14)
BUN SERPL-MCNC: 16 MG/DL (ref 7–30)
CALCIUM SERPL-MCNC: 9.4 MG/DL (ref 8.5–10.1)
CHLORIDE SERPL-SCNC: 105 MMOL/L (ref 94–109)
CHOLEST SERPL-MCNC: 213 MG/DL
CO2 SERPL-SCNC: 30 MMOL/L (ref 20–32)
CREAT SERPL-MCNC: 1.35 MG/DL (ref 0.66–1.25)
GFR SERPL CREATININE-BSD FRML MDRD: 51 ML/MIN/{1.73_M2}
GLUCOSE SERPL-MCNC: 119 MG/DL (ref 70–99)
HDLC SERPL-MCNC: 33 MG/DL
LDLC SERPL CALC-MCNC: 112 MG/DL
NONHDLC SERPL-MCNC: 180 MG/DL
POTASSIUM SERPL-SCNC: 4.6 MMOL/L (ref 3.4–5.3)
SODIUM SERPL-SCNC: 139 MMOL/L (ref 133–144)
TRIGL SERPL-MCNC: 340 MG/DL
TSH SERPL DL<=0.005 MIU/L-ACNC: 0.85 MU/L (ref 0.4–4)

## 2020-04-09 PROCEDURE — 84443 ASSAY THYROID STIM HORMONE: CPT | Performed by: FAMILY MEDICINE

## 2020-04-09 PROCEDURE — 36415 COLL VENOUS BLD VENIPUNCTURE: CPT | Performed by: FAMILY MEDICINE

## 2020-04-09 PROCEDURE — 80048 BASIC METABOLIC PNL TOTAL CA: CPT | Performed by: FAMILY MEDICINE

## 2020-04-09 PROCEDURE — 99214 OFFICE O/P EST MOD 30 MIN: CPT | Performed by: FAMILY MEDICINE

## 2020-04-09 PROCEDURE — 80061 LIPID PANEL: CPT | Performed by: FAMILY MEDICINE

## 2020-04-09 RX ORDER — AMLODIPINE BESYLATE 10 MG/1
10 TABLET ORAL DAILY
Qty: 90 TABLET | Refills: 1 | Status: SHIPPED | OUTPATIENT
Start: 2020-04-09 | End: 2020-12-19

## 2020-04-09 RX ORDER — LEVOTHYROXINE SODIUM 150 UG/1
150 TABLET ORAL DAILY
Qty: 90 TABLET | Refills: 1 | Status: SHIPPED | OUTPATIENT
Start: 2020-04-09 | End: 2020-12-18

## 2020-04-09 RX ORDER — ATENOLOL 50 MG/1
TABLET ORAL
Qty: 90 TABLET | Refills: 1 | Status: SHIPPED | OUTPATIENT
Start: 2020-04-09 | End: 2020-12-31

## 2020-04-09 ASSESSMENT — MIFFLIN-ST. JEOR: SCORE: 1941.48

## 2020-04-09 NOTE — PROGRESS NOTES
Subjective     Mamadou Pena is a 74 year old male who presents to clinic today for the following health issues:    HPI   Hypertension Follow-up      Do you check your blood pressure regularly outside of the clinic? No     Are you following a low salt diet? No    Are your blood pressures ever more than 140 on the top number (systolic) OR more   than 90 on the bottom number (diastolic), for example 140/90? n/a    currently taking Norvasc 10 mg , Atenolol 50 mg.   Patient is complaint with medications. No side effects.     Hyperlipidemia Follow-Up      Are you regularly taking any medication or supplement to lower your cholesterol?   No    Are you having muscle aches or other side effects that you think could be caused by your cholesterol lowering medication?  N/a      Hypothyroidism Follow-up      Since last visit, patient describes the following symptoms: dry skin  Currently taking Levothyroxine 150 MCG    TSH   Date Value Ref Range Status   07/10/2019 0.28 (L) 0.40 - 4.00 mU/L Final         Patient Active Problem List   Diagnosis     Hypothyroid     GERD (gastroesophageal reflux disease)     ED (erectile dysfunction)     Sleep apnea     CARDIOVASCULAR SCREENING; LDL GOAL LESS THAN 130     Hypertension goal BP (blood pressure) < 140/90     Advanced directives, counseling/discussion     Obesity     DDD (degenerative disc disease), lumbar     High cholesterol     Gastroesophageal reflux disease with esophagitis     WILBERTO (obstructive sleep apnea)     Primary osteoarthritis of right hand     Past Surgical History:   Procedure Laterality Date     C ANESTH,DX ARTHROSCOPIC PROC KNEE JOINT  9/2006    L     CARPAL TUNNEL RELEASE RT/LT       COLONOSCOPY       COMBINED ESOPHAGOSCOPY, GASTROSCOPY, DUODENOSCOPY (EGD) WITH CO2 INSUFFLATION N/A 6/26/2018    Procedure: COMBINED ESOPHAGOSCOPY, GASTROSCOPY, DUODENOSCOPY (EGD) WITH CO2 INSUFFLATION;  EGD-GASTROESOPHAGEAL REFLUX DISEASE / DARIUSZ ;  Surgeon: Otilio Brown  DO R[place];  Location: [facility]     ESOPHAGOSCOPY, GASTROSCOPY, DUODENOSCOPY (EGD), COMBINED N/A 2018    Procedure: COMBINED ESOPHAGOSCOPY, GASTROSCOPY, DUODENOSCOPY (EGD), BIOPSY SINGLE OR MULTIPLE;;  Surgeon: Otilio Brown DO;  Location: [facility] OR     TONSILLECTOMY  1950     VASECTOMY         Social History     Tobacco Use     Smoking status: Former Smoker     Last attempt to quit: 1971     Years since quittin.2     Smokeless tobacco: Never Used   Substance Use Topics     Alcohol use: Yes     Alcohol/week: 1.7 - 2.5 standard drinks     Types: 2 - 3 Standard drinks or equivalent per week     Family History   Problem Relation Age of Onset     Hypertension Mother      Heart Disease Father      Hypertension Paternal Grandfather      Asthma Daughter          Current Outpatient Medications   Medication Sig Dispense Refill     amLODIPine (NORVASC) 10 MG tablet Take 1 tablet (10 mg) by mouth daily 90 tablet 1     ASPIRIN 325 MG OR TBEC 1 TABLET DAILY       atenolol (TENORMIN) 50 MG tablet TAKE 1 TABLET BY MOUTH EVERY DAY 90 tablet 1     diclofenac (VOLTAREN) 1 % GEL topical gel Apply 4 grams to knees or 2 grams to hands four times daily using enclosed dosing card. 100 g 1     fluticasone (FLONASE) 50 MCG/ACT nasal spray Spray 1-2 sprays into both nostrils daily 16 g 5     levothyroxine (LEVOXYL) 150 MCG tablet Take 1 tablet (150 mcg) by mouth daily 90 tablet 1     Multiple Vitamins-Minerals (CENTRUM SILVER) per tablet Take 1 tablet by mouth daily       omeprazole (PRILOSEC) 20 MG DR capsule Take 1 capsule (20 mg) by mouth daily 90 capsule 3     order for DME Needs CPAP supplies   He continue to use and benefit from the CPAP  780.57C Sleep apnea 1 Units 1     order for DME Equipment being ordered: CPAP mask and supplies 1 Units 1     order for DME Equipment being ordered: CPAP mask and supplies 1 Units 0     acyclovir (ZOVIRAX) 400 MG tablet Take 1 tablet (400 mg) by mouth daily (Patient not  "taking: Reported on 4/9/2020) 90 tablet 3     No Known Allergies  Recent Labs   Lab Test 07/10/19  0845 06/20/18  1523 11/16/17  0919 11/15/16  0956 01/15/16  0845 01/06/15  0714  10/16/12  0742   LDL 64  --  123*  --  107* 110   < > 118   HDL 33*  --  34*  --  32* 31*   < > 32*   TRIG 313*  --  266*  --  334* 374*   < > 282*   ALT  --   --   --  36  --  41  --  35   CR 1.10 1.19 1.18 1.28* 1.25  --    < > 1.08   GFRESTIMATED 66 60* 61 55* 57*  --    < > 68   GFRESTBLACK 76 73 73 67 69  --    < > 83   POTASSIUM 4.1 4.2 4.7 4.7 4.2  --    < > 4.4   TSH 0.28* 1.36 2.22  --  2.62 3.09   < > 5.41*    < > = values in this interval not displayed.      BP Readings from Last 3 Encounters:   04/09/20 132/78   07/16/19 133/68   06/26/18 106/63    Wt Readings from Last 3 Encounters:   04/09/20 117.9 kg (260 lb)   07/16/19 115.7 kg (255 lb)   06/21/18 116.1 kg (256 lb)                    Reviewed and updated as needed this visit by Provider  Tobacco  Allergies  Meds  Problems  Med Hx  Surg Hx  Fam Hx         Review of Systems   ROS COMP: Constitutional, HEENT, cardiovascular, pulmonary, gi and gu systems are negative, except as otherwise noted.      Objective    /78   Pulse 65   Temp 98.3  F (36.8  C) (Tympanic)   Resp 18   Ht 1.803 m (5' 11\")   Wt 117.9 kg (260 lb)   SpO2 98%   BMI 36.26 kg/m    Body mass index is 36.26 kg/m .  Physical Exam  Vitals signs and nursing note reviewed.   Constitutional:       General: He is not in acute distress.     Appearance: Normal appearance. He is not ill-appearing, toxic-appearing or diaphoretic.   HENT:      Head: Normocephalic and atraumatic.   Neck:      Musculoskeletal: Normal range of motion and neck supple.   Cardiovascular:      Pulses: Normal pulses.   Pulmonary:      Effort: Pulmonary effort is normal.   Neurological:      Mental Status: He is alert.                    Assessment & Plan     1. Essential hypertension  currently taking Norvasc 10 mg , Atenolol 50 " "mg.   Patient is complaint with medications. No side effects.   Blood pressure well controlled.  Continue current medication.    - Basic metabolic panel  (Ca, Cl, CO2, Creat, Gluc, K, Na, BUN)  - amLODIPine (NORVASC) 10 MG tablet; Take 1 tablet (10 mg) by mouth daily  Dispense: 90 tablet; Refill: 1  - atenolol (TENORMIN) 50 MG tablet; TAKE 1 TABLET BY MOUTH EVERY DAY  Dispense: 90 tablet; Refill: 1  - levothyroxine (LEVOXYL) 150 MCG tablet; Take 1 tablet (150 mcg) by mouth daily  Dispense: 90 tablet; Refill: 1    2. Hypothyroidism, unspecified type  Patient is currently on levothyroxine 150 MCG daily.  Advised to take levothyroxine in the morning on empty stomach without any food or medication.  Patient said he is currently taking levothyroxine with other medications, he takes it before breakfast.  Will obtain TSH today.  Continue levothyroxine 150 MCG for now  - TSH with free T4 reflex  - amLODIPine (NORVASC) 10 MG tablet; Take 1 tablet (10 mg) by mouth daily  Dispense: 90 tablet; Refill: 1  - atenolol (TENORMIN) 50 MG tablet; TAKE 1 TABLET BY MOUTH EVERY DAY  Dispense: 90 tablet; Refill: 1  - levothyroxine (LEVOXYL) 150 MCG tablet; Take 1 tablet (150 mcg) by mouth daily  Dispense: 90 tablet; Refill: 1    3. High cholesterol  Will obtain fasting lipid profile today  - Lipid panel reflex to direct LDL Fasting       BMI:   Estimated body mass index is 36.26 kg/m  as calculated from the following:    Height as of this encounter: 1.803 m (5' 11\").    Weight as of this encounter: 117.9 kg (260 lb).   Weight management plan: Discussed healthy diet and exercise guidelines        Work on weight loss  Regular exercise    Return in about 6 months (around 10/9/2020).  Patient verbalized understanding and agreed on the plan of care.  All questions answered.  Jam Burton MD  [facility]    "

## 2020-04-11 RX ORDER — ATORVASTATIN CALCIUM 20 MG/1
20 TABLET, FILM COATED ORAL DAILY
Qty: 30 TABLET | Refills: 3 | Status: SHIPPED | OUTPATIENT
Start: 2020-04-11 | End: 2020-08-03

## 2020-05-12 ENCOUNTER — TELEPHONE (OUTPATIENT)
Dept: FAMILY MEDICINE | Facility: CLINIC | Age: 75
End: 2020-05-12

## 2020-05-12 NOTE — TELEPHONE ENCOUNTER
Notes recorded by Jam Burton MD on 4/11/2020 at 2:15 PM CDT   Jose Eduardo Cedillo,         -LDL(bad) cholesterol level is elevated, HDL(good) cholesterol level is low and your triglycerides are elevated which can increase your heart disease risk.  A diet high in fat and simple carbohydrates, genetics and being overweight can contribute to this. ADVISE: exercising 150 minutes of aerobic exercise per week (30 minutes for 5 days per week or 50 minutes for 3 days per week are options), eating a low saturated fat/low carbohydrate diet, and omega-3 fatty acids (fish oil) 4831-7305 mg daily are helpful to improve this. In 6 months, you should recheck your fasting cholesterol panel by scheduling a lab-only appointment.   I also recommend starting Statin therapy or cholesterol lowering medications like Lipitor for primary prevention. I sent a prescription to your pharmacy. Please let me know what do you think about that.       Creatinine slightly elevated above normal range which means your kidney function is slightly less than last year., I recommended increase water intake and repeat kidney function in 6 months   Feel free to contact me with any questions or concerns. Thank you for allowing me to participate in your care.     Jam Burton MD.     I called and spoke with patient.  I did let himknow that the instructions regarding his cholesterol and the prescription for the atorvastatin (lipitor) are attached to the 4/11/20 labwork notification.  He had viewed the 4/10/20 notification but not the 4/11.  I read Dr. Burton's note/instructions to him.  He states understanding and states will follow these instructions.  He had opened up his bag of prescription's from his pharmacy and didn't recognize the atorvastatin so wanted to make sure his provider had prescribed this for him.  I did review some of the potential side effects; ie body aches/muscle aches, darker colored urine.  He states is reading the handout that the  pharmacy gave him now which also lists side effects.  No questions.  Adriana Hightower RN

## 2020-05-12 NOTE — TELEPHONE ENCOUNTER
Reason for Call:  Other call back    Detailed comments: pt rx'd: atorvastin 20mg.  Pt not aware he should be on this med.  Please call and discuss.      Phone Number Patient can be reached at: Cell number on file:    Telephone Information:   Mobile [phone]       Best Time: any    Can we leave a detailed message on this number? YES    Call taken on 5/12/2020 at 10:34 AM by Qian Mejia

## 2020-07-31 DIAGNOSIS — E78.00 HIGH CHOLESTEROL: ICD-10-CM

## 2020-08-03 RX ORDER — ATORVASTATIN CALCIUM 20 MG/1
20 TABLET, FILM COATED ORAL DAILY
Qty: 90 TABLET | Refills: 1 | Status: SHIPPED | OUTPATIENT
Start: 2020-08-03 | End: 2021-02-02

## 2020-12-06 ENCOUNTER — HEALTH MAINTENANCE LETTER (OUTPATIENT)
Age: 75
End: 2020-12-06

## 2020-12-17 DIAGNOSIS — I10 ESSENTIAL HYPERTENSION: ICD-10-CM

## 2020-12-17 DIAGNOSIS — E03.9 HYPOTHYROIDISM, UNSPECIFIED TYPE: ICD-10-CM

## 2020-12-18 RX ORDER — LEVOTHYROXINE SODIUM 150 UG/1
150 TABLET ORAL DAILY
Qty: 90 TABLET | Refills: 0 | Status: SHIPPED | OUTPATIENT
Start: 2020-12-18 | End: 2021-03-17

## 2020-12-18 NOTE — TELEPHONE ENCOUNTER
Routing refill request to provider for review/approval because:  Labs out of range:  Cr  Kaila Ordaz BSN, RN

## 2020-12-19 RX ORDER — AMLODIPINE BESYLATE 10 MG/1
10 TABLET ORAL DAILY
Qty: 90 TABLET | Refills: 1 | Status: SHIPPED | OUTPATIENT
Start: 2020-12-19 | End: 2021-07-27

## 2021-02-01 DIAGNOSIS — E78.00 HIGH CHOLESTEROL: ICD-10-CM

## 2021-02-02 RX ORDER — ATORVASTATIN CALCIUM 20 MG/1
20 TABLET, FILM COATED ORAL DAILY
Qty: 90 TABLET | Refills: 1 | Status: SHIPPED | OUTPATIENT
Start: 2021-02-02 | End: 2021-08-02

## 2021-03-01 ENCOUNTER — IMMUNIZATION (OUTPATIENT)
Dept: PEDIATRICS | Facility: CLINIC | Age: 76
End: 2021-03-01
Payer: MEDICARE

## 2021-03-01 PROCEDURE — 0001A PR COVID VAC PFIZER DIL RECON 30 MCG/0.3 ML IM: CPT

## 2021-03-01 PROCEDURE — 91300 PR COVID VAC PFIZER DIL RECON 30 MCG/0.3 ML IM: CPT

## 2021-03-15 DIAGNOSIS — E03.9 HYPOTHYROIDISM, UNSPECIFIED TYPE: ICD-10-CM

## 2021-03-15 DIAGNOSIS — I10 ESSENTIAL HYPERTENSION: ICD-10-CM

## 2021-03-17 RX ORDER — LEVOTHYROXINE SODIUM 150 UG/1
150 TABLET ORAL DAILY
Qty: 90 TABLET | Refills: 0 | Status: SHIPPED | OUTPATIENT
Start: 2021-03-17 | End: 2021-06-09

## 2021-03-22 ENCOUNTER — IMMUNIZATION (OUTPATIENT)
Dept: PEDIATRICS | Facility: CLINIC | Age: 76
End: 2021-03-22
Attending: INTERNAL MEDICINE
Payer: MEDICARE

## 2021-03-22 PROCEDURE — 91300 PR COVID VAC PFIZER DIL RECON 30 MCG/0.3 ML IM: CPT

## 2021-03-22 PROCEDURE — 0002A PR COVID VAC PFIZER DIL RECON 30 MCG/0.3 ML IM: CPT

## 2021-03-29 DIAGNOSIS — E03.9 HYPOTHYROIDISM, UNSPECIFIED TYPE: ICD-10-CM

## 2021-03-29 DIAGNOSIS — I10 ESSENTIAL HYPERTENSION: ICD-10-CM

## 2021-03-29 RX ORDER — ATENOLOL 50 MG/1
TABLET ORAL
Qty: 90 TABLET | Refills: 0 | OUTPATIENT
Start: 2021-03-29

## 2021-03-29 NOTE — TELEPHONE ENCOUNTER
Atenolol refill request  Last OV: 4/9/20 with Dr. Burton.  Per last refill needs appointment; no letter sent.  To MD to advise.  Adriana Hightower RN

## 2021-06-09 DIAGNOSIS — I10 ESSENTIAL HYPERTENSION: ICD-10-CM

## 2021-06-09 DIAGNOSIS — E03.9 HYPOTHYROIDISM, UNSPECIFIED TYPE: ICD-10-CM

## 2021-06-09 RX ORDER — LEVOTHYROXINE SODIUM 150 UG/1
150 TABLET ORAL DAILY
Qty: 90 TABLET | Refills: 0 | Status: SHIPPED | OUTPATIENT
Start: 2021-06-09 | End: 2021-07-08 | Stop reason: DRUGHIGH

## 2021-06-09 NOTE — TELEPHONE ENCOUNTER
Routing refill request to provider for review/approval because:  Labs not current:  TSH    Amanda Burnett BSN, RN

## 2021-06-19 DIAGNOSIS — E03.9 HYPOTHYROIDISM, UNSPECIFIED TYPE: ICD-10-CM

## 2021-06-19 DIAGNOSIS — I10 ESSENTIAL HYPERTENSION: ICD-10-CM

## 2021-06-21 RX ORDER — AMLODIPINE BESYLATE 10 MG/1
10 TABLET ORAL DAILY
Qty: 90 TABLET | Refills: 1 | OUTPATIENT
Start: 2021-06-21

## 2021-06-21 NOTE — TELEPHONE ENCOUNTER
"Requested Prescriptions   Pending Prescriptions Disp Refills    amLODIPine (NORVASC) 10 MG tablet 90 tablet 1     Sig: Take 1 tablet (10 mg) by mouth daily       Calcium Channel Blockers Protocol  Failed - 6/19/2021  3:22 PM        Failed - Blood pressure under 140/90 in past 12 months     BP Readings from Last 3 Encounters:   04/09/20 132/78   07/16/19 133/68   06/26/18 106/63                 Failed - Recent (12 mo) or future (30 days) visit within the authorizing provider's specialty     Patient has had an office visit with the authorizing provider or a provider within the authorizing providers department within the previous 12 mos or has a future within next 30 days. See \"Patient Info\" tab in inbasket, or \"Choose Columns\" in Meds & Orders section of the refill encounter.              Failed - Normal serum creatinine on file in past 12 months     Recent Labs   Lab Test 04/09/20  0945   CR 1.35*       Ok to refill medication if creatinine is low          Passed - Medication is active on med list        Passed - Patient is age 18 or older             "

## 2021-06-21 NOTE — TELEPHONE ENCOUNTER
Called and spoke to patient, appointment made. He will come in fasting, he does not need a refill before this.Michelle Moy MA/TC

## 2021-06-22 NOTE — PROGRESS NOTES
"    Assessment & Plan     Essential hypertension  BP is well controlled  Currently taking Norvasc and Atenolol   Continue the same meds   - Basic metabolic panel  (Ca, Cl, CO2, Creat, Gluc, K, Na, BUN)    Hip pain, left  Left hip/groin pain   Dd hip arthritis , back pain with sciatica, ligament sprain   Will obtain X ray   If X ray negative symptoms likely back pain related or ligament sprain    - XR Hip Left 2-3 Views    Hypothyroidism, unspecified type  Stable   tsh today   Continue synthroid   - TSH WITH FREE T4 REFLEX    High cholesterol  Chronic stable problem   - Lipid panel reflex to direct LDL Non-fasting    Colon cancer screening    - GASTROENTEROLOGY ADULT REF PROCEDURE ONLY             BMI:   Estimated body mass index is 34.17 kg/m  as calculated from the following:    Height as of this encounter: 1.803 m (5' 11\").    Weight as of this encounter: 111.1 kg (245 lb).           Return in about 6 months (around 12/25/2021), or if symptoms worsen or fail to improve, for Follow up, Routine preventive, in person.    Jam Burton MD  [facility] CHRIS Heath is a 75 year old who presents for the following health issues     HPI     Hyperlipidemia Follow-Up      Are you regularly taking any medication or supplement to lower your cholesterol?   Yes- atorvastatin    Are you having muscle aches or other side effects that you think could be caused by your cholesterol lowering medication?  No    Hypertension Follow-up      Do you check your blood pressure regularly outside of the clinic? Yes     Are you following a low salt diet? No    Are your blood pressures ever more than 140 on the top number (systolic) OR more   than 90 on the bottom number (diastolic), for example 140/90? No      Left hip, on going last 3 weeks, when walking, laying down in bed, radiates down into leg not so much into back, no numbness or tingling, only a dull ache  No injury or trauma   L2, L3, L4 fusion   Left " "hip : aggravated by walking   No numbness no tingling     Review of Systems   Constitutional, HEENT, cardiovascular, pulmonary, gi and gu systems are negative, except as otherwise noted.      Objective    /84   Pulse 57   Temp 97.9  F (36.6  C) (Tympanic)   Ht 1.803 m (5' 11\")   Wt 111.1 kg (245 lb)   SpO2 98%   BMI 34.17 kg/m    Body mass index is 34.17 kg/m .  Physical Exam  Vitals signs and nursing note reviewed.   Constitutional:       General: He is not in acute distress.     Appearance: Normal appearance. He is not ill-appearing, toxic-appearing or diaphoretic.   Musculoskeletal:      Left hip: He exhibits normal strength, no tenderness, no bony tenderness, no swelling, no crepitus, no deformity and no laceration.      Lumbar back: He exhibits normal range of motion, no tenderness, no bony tenderness, no swelling, no edema, no deformity, no laceration, no pain, no spasm and normal pulse.        Legs:    Neurological:      Mental Status: He is alert.                        "

## 2021-06-25 ENCOUNTER — ANCILLARY PROCEDURE (OUTPATIENT)
Dept: GENERAL RADIOLOGY | Facility: CLINIC | Age: 76
End: 2021-06-25
Attending: FAMILY MEDICINE
Payer: MEDICARE

## 2021-06-25 ENCOUNTER — OFFICE VISIT (OUTPATIENT)
Dept: FAMILY MEDICINE | Facility: CLINIC | Age: 76
End: 2021-06-25
Payer: MEDICARE

## 2021-06-25 VITALS
BODY MASS INDEX: 34.3 KG/M2 | OXYGEN SATURATION: 98 % | HEIGHT: 71 IN | TEMPERATURE: 97.9 F | SYSTOLIC BLOOD PRESSURE: 136 MMHG | HEART RATE: 57 BPM | DIASTOLIC BLOOD PRESSURE: 84 MMHG | WEIGHT: 245 LBS

## 2021-06-25 DIAGNOSIS — E78.00 HIGH CHOLESTEROL: ICD-10-CM

## 2021-06-25 DIAGNOSIS — Z12.11 COLON CANCER SCREENING: ICD-10-CM

## 2021-06-25 DIAGNOSIS — I10 ESSENTIAL HYPERTENSION: Primary | ICD-10-CM

## 2021-06-25 DIAGNOSIS — E03.9 HYPOTHYROIDISM, UNSPECIFIED TYPE: ICD-10-CM

## 2021-06-25 DIAGNOSIS — M25.552 HIP PAIN, LEFT: ICD-10-CM

## 2021-06-25 LAB
ANION GAP SERPL CALCULATED.3IONS-SCNC: 2 MMOL/L (ref 3–14)
BUN SERPL-MCNC: 14 MG/DL (ref 7–30)
CALCIUM SERPL-MCNC: 9 MG/DL (ref 8.5–10.1)
CHLORIDE SERPL-SCNC: 103 MMOL/L (ref 94–109)
CHOLEST SERPL-MCNC: 139 MG/DL
CO2 SERPL-SCNC: 34 MMOL/L (ref 20–32)
CREAT SERPL-MCNC: 1.09 MG/DL (ref 0.66–1.25)
GFR SERPL CREATININE-BSD FRML MDRD: 66 ML/MIN/{1.73_M2}
GLUCOSE SERPL-MCNC: 116 MG/DL (ref 70–99)
HDLC SERPL-MCNC: 37 MG/DL
LDLC SERPL CALC-MCNC: 61 MG/DL
NONHDLC SERPL-MCNC: 102 MG/DL
POTASSIUM SERPL-SCNC: 4.8 MMOL/L (ref 3.4–5.3)
SODIUM SERPL-SCNC: 139 MMOL/L (ref 133–144)
T4 FREE SERPL-MCNC: 1.51 NG/DL (ref 0.76–1.46)
TRIGL SERPL-MCNC: 203 MG/DL
TSH SERPL DL<=0.005 MIU/L-ACNC: 0.09 MU/L (ref 0.4–4)

## 2021-06-25 PROCEDURE — 84443 ASSAY THYROID STIM HORMONE: CPT | Performed by: FAMILY MEDICINE

## 2021-06-25 PROCEDURE — 80048 BASIC METABOLIC PNL TOTAL CA: CPT | Performed by: FAMILY MEDICINE

## 2021-06-25 PROCEDURE — 99214 OFFICE O/P EST MOD 30 MIN: CPT | Performed by: FAMILY MEDICINE

## 2021-06-25 PROCEDURE — 73502 X-RAY EXAM HIP UNI 2-3 VIEWS: CPT | Performed by: RADIOLOGY

## 2021-06-25 PROCEDURE — 36415 COLL VENOUS BLD VENIPUNCTURE: CPT | Performed by: FAMILY MEDICINE

## 2021-06-25 PROCEDURE — 80061 LIPID PANEL: CPT | Performed by: FAMILY MEDICINE

## 2021-06-25 PROCEDURE — 84439 ASSAY OF FREE THYROXINE: CPT | Performed by: FAMILY MEDICINE

## 2021-06-25 ASSESSMENT — MIFFLIN-ST. JEOR: SCORE: 1868.44

## 2021-07-07 ENCOUNTER — TELEPHONE (OUTPATIENT)
Dept: FAMILY MEDICINE | Facility: CLINIC | Age: 76
End: 2021-07-07

## 2021-07-07 RX ORDER — LEVOTHYROXINE SODIUM 137 UG/1
137 TABLET ORAL DAILY
Qty: 90 TABLET | Refills: 1 | Status: SHIPPED | OUTPATIENT
Start: 2021-07-07 | End: 2021-12-16

## 2021-07-07 NOTE — TELEPHONE ENCOUNTER
Jam Burton MD sent to [facility]             Please call the patient with the following message       Jose Eduardo Heath,   Your lab result showed TSH is low and T4 is high this combination means thyroid level is above normal. I recommend lowering synthroid dose to 137 mcg daily instead of 150 mcg     New prescription sent to your pharmacy   Feel free to contact us with any questions or concerns. Thank you for allowing us to participate in your care.     Jam Burton MD.

## 2021-07-07 NOTE — TELEPHONE ENCOUNTER
Left message on answering machine for patient to call back to [phone].    RN please give patient provider message as written.  Kaila KYLEN, RN

## 2021-07-08 NOTE — TELEPHONE ENCOUNTER
Left message on cell and home answering machines for patient to call back to [phone].    RN please give patient provider message as written.  Kaila KYLEN, RN

## 2021-07-08 NOTE — TELEPHONE ENCOUNTER
Patient notified of Provider's message as written.  Patient verbalized understanding.    Rema Hurd RN  [facility]

## 2021-07-26 DIAGNOSIS — I10 ESSENTIAL HYPERTENSION: ICD-10-CM

## 2021-07-26 DIAGNOSIS — E03.9 HYPOTHYROIDISM, UNSPECIFIED TYPE: ICD-10-CM

## 2021-07-27 RX ORDER — AMLODIPINE BESYLATE 10 MG/1
10 TABLET ORAL DAILY
Qty: 90 TABLET | Refills: 1 | Status: SHIPPED | OUTPATIENT
Start: 2021-07-27 | End: 2021-12-16

## 2021-08-01 DIAGNOSIS — E78.00 HIGH CHOLESTEROL: ICD-10-CM

## 2021-08-02 RX ORDER — ATORVASTATIN CALCIUM 20 MG/1
20 TABLET, FILM COATED ORAL DAILY
Qty: 90 TABLET | Refills: 2 | Status: SHIPPED | OUTPATIENT
Start: 2021-08-02 | End: 2022-02-08

## 2021-09-01 ENCOUNTER — TELEPHONE (OUTPATIENT)
Dept: GASTROENTEROLOGY | Facility: CLINIC | Age: 76
End: 2021-09-01

## 2021-09-01 NOTE — TELEPHONE ENCOUNTER
Screening Questions  Are you active on mychart? YES   1. What insurance is in the chart? Medicare & C     2.  Ordering/Referring Provider: [facility] MD [facility]    3. BMI 34.17    4. Are you on daily home oxygen? No     5. Do you have a history of difficult airway? No     6. Have you had a heart, lung, or liver transplant? No     7. Are you currently on dialysis? No     8. Have you had a stroke or Transient ischemic atttack (TIA) within 6 months? No     9. In the past 6 months, have you had any heart related issues including cardiomyopathy or heart attack?         If yes, did it require cardiac stenting or other implantable device?no     10. Do you have any implantable devices in your body (pacemaker, defib, LVAD)? No     11. Do you take nitroglycerin? If yes, how often? No     12. Are you currently taking any blood thinners? No     13. Are you a diabetic? No     14. (Females) Are you currently pregnant? NA  If yes, how many weeks?    15. Have you had a procedure in the past that was difficult to tolerate with conscious sedation? Any allergies to Fentanyl or Versed no     16. Are you taking any scheduled prescription narcotics more than once daily? No     17. Do you have any chemical dependencies such as alcohol, street drugs, or methadone? No     18. Do you have any history of post-traumatic stress syndrome or mental health issues? No     19. Do you transfer independently? Yes     20.  Do you have any issues with constipation? No     21. Preferred Pharmacy for Pre Prescription  [facility] [place] ([address]     Scheduling Details    Which Colonoscopy Prep was Sent?: Miralax Mychart  Procedure Scheduled: Colonoscopy   Provider/Surgeon: Zhane   Date of Procedure: 09/30/2021  Location: [facility]   Caller (Please ask for phone number if not scheduled by patient): PATIENT       Sedation Type: CS   Conscious Sedation- Needs  for 6 hours after the procedure  MAC/General-Needs  for 24  hours after procedure    Pre-op Required at [facility] [place], [place] and OR for MAC sedation:   (if yes advise patient they will need a pre-op prior to procedure)      Is patient on blood thinners? -NO  (If yes- inform patient to follow up with PCP or provider for follow up instructions)     Informed patient they will need an adult  YES   Cannot take any type of public or medical transportation alone    Informed Patient of COVID Test Requirement YES-SCHEDULED     Confirmed Nurse will call to complete assessment YES     Additional comments:

## 2021-09-02 DIAGNOSIS — Z11.59 ENCOUNTER FOR SCREENING FOR OTHER VIRAL DISEASES: ICD-10-CM

## 2021-09-24 ASSESSMENT — MIFFLIN-ST. JEOR: SCORE: 1868.44

## 2021-09-26 ENCOUNTER — HEALTH MAINTENANCE LETTER (OUTPATIENT)
Age: 76
End: 2021-09-26

## 2021-09-27 ENCOUNTER — LAB (OUTPATIENT)
Dept: LAB | Facility: CLINIC | Age: 76
End: 2021-09-27
Payer: MEDICARE

## 2021-09-27 DIAGNOSIS — Z11.59 ENCOUNTER FOR SCREENING FOR OTHER VIRAL DISEASES: ICD-10-CM

## 2021-09-27 LAB — SARS-COV-2 RNA RESP QL NAA+PROBE: NEGATIVE

## 2021-09-27 PROCEDURE — U0003 INFECTIOUS AGENT DETECTION BY NUCLEIC ACID (DNA OR RNA); SEVERE ACUTE RESPIRATORY SYNDROME CORONAVIRUS 2 (SARS-COV-2) (CORONAVIRUS DISEASE [COVID-19]), AMPLIFIED PROBE TECHNIQUE, MAKING USE OF HIGH THROUGHPUT TECHNOLOGIES AS DESCRIBED BY CMS-2020-01-R: HCPCS

## 2021-09-27 PROCEDURE — U0005 INFEC AGEN DETEC AMPLI PROBE: HCPCS

## 2021-09-30 ENCOUNTER — HOSPITAL ENCOUNTER (OUTPATIENT)
Facility: AMBULATORY SURGERY CENTER | Age: 76
Discharge: HOME | End: 2021-09-30
Attending: INTERNAL MEDICINE | Admitting: INTERNAL MEDICINE
Payer: MEDICARE

## 2021-09-30 VITALS
DIASTOLIC BLOOD PRESSURE: 63 MMHG | RESPIRATION RATE: 16 BRPM | SYSTOLIC BLOOD PRESSURE: 108 MMHG | TEMPERATURE: 97.2 F | OXYGEN SATURATION: 95 % | HEART RATE: 52 BPM | HEIGHT: 71 IN | BODY MASS INDEX: 34.3 KG/M2 | WEIGHT: 245 LBS

## 2021-09-30 LAB — COLONOSCOPY: NORMAL

## 2021-09-30 PROCEDURE — G0121 COLON CA SCRN NOT HI RSK IND: HCPCS

## 2021-09-30 PROCEDURE — G8907 PT DOC NO EVENTS ON DISCHARG: HCPCS

## 2021-09-30 PROCEDURE — G8918 PT W/O PREOP ORDER IV AB PRO: HCPCS

## 2021-09-30 RX ORDER — NALOXONE HYDROCHLORIDE 0.4 MG/ML
0.4 INJECTION, SOLUTION INTRAMUSCULAR; INTRAVENOUS; SUBCUTANEOUS
Status: DISCONTINUED | OUTPATIENT
Start: 2021-09-30 | End: 2021-10-01 | Stop reason: HOSPADM

## 2021-09-30 RX ORDER — ONDANSETRON 2 MG/ML
4 INJECTION INTRAMUSCULAR; INTRAVENOUS
Status: DISCONTINUED | OUTPATIENT
Start: 2021-09-30 | End: 2021-10-01 | Stop reason: HOSPADM

## 2021-09-30 RX ORDER — NALOXONE HYDROCHLORIDE 0.4 MG/ML
0.2 INJECTION, SOLUTION INTRAMUSCULAR; INTRAVENOUS; SUBCUTANEOUS
Status: DISCONTINUED | OUTPATIENT
Start: 2021-09-30 | End: 2021-10-01 | Stop reason: HOSPADM

## 2021-09-30 RX ORDER — ONDANSETRON 2 MG/ML
4 INJECTION INTRAMUSCULAR; INTRAVENOUS EVERY 6 HOURS PRN
Status: DISCONTINUED | OUTPATIENT
Start: 2021-09-30 | End: 2021-10-01 | Stop reason: HOSPADM

## 2021-09-30 RX ORDER — FLUMAZENIL 0.1 MG/ML
0.2 INJECTION, SOLUTION INTRAVENOUS
Status: DISCONTINUED | OUTPATIENT
Start: 2021-09-30 | End: 2021-10-01 | Stop reason: HOSPADM

## 2021-09-30 RX ORDER — ONDANSETRON 4 MG/1
4 TABLET, ORALLY DISINTEGRATING ORAL EVERY 6 HOURS PRN
Status: DISCONTINUED | OUTPATIENT
Start: 2021-09-30 | End: 2021-10-01 | Stop reason: HOSPADM

## 2021-09-30 RX ORDER — LIDOCAINE 40 MG/G
CREAM TOPICAL
Status: DISCONTINUED | OUTPATIENT
Start: 2021-09-30 | End: 2021-10-01 | Stop reason: HOSPADM

## 2021-09-30 RX ORDER — PROCHLORPERAZINE MALEATE 5 MG
5 TABLET ORAL EVERY 6 HOURS PRN
Status: DISCONTINUED | OUTPATIENT
Start: 2021-09-30 | End: 2021-10-01 | Stop reason: HOSPADM

## 2021-09-30 RX ORDER — FENTANYL CITRATE 50 UG/ML
INJECTION, SOLUTION INTRAMUSCULAR; INTRAVENOUS PRN
Status: DISCONTINUED | OUTPATIENT
Start: 2021-09-30 | End: 2021-09-30 | Stop reason: HOSPADM

## 2021-12-15 NOTE — PROGRESS NOTES
"  Assessment & Plan     Essential hypertension  Blood pressure well controlled.  BMP today.  Continue atenolol and Norvasc.  Refill provided.  - atenolol (TENORMIN) 50 MG tablet; TAKE 1 TABLET BY MOUTH EVERY DAY  - amLODIPine (NORVASC) 10 MG tablet; Take 1 tablet (10 mg) by mouth daily  - Basic metabolic panel  (Ca, Cl, CO2, Creat, Gluc, K, Na, BUN); Future  - Basic metabolic panel  (Ca, Cl, CO2, Creat, Gluc, K, Na, BUN)    Hypothyroidism, unspecified type  Currently taking Synthroid 137 MCG daily.  He takes medication properly.  The dose was decreased last visit.  Will obtain thyroid labs.  For now we will continue the same dose.  - levothyroxine (SYNTHROID/LEVOTHROID) 137 MCG tablet; Take 1 tablet (137 mcg) by mouth daily  - atenolol (TENORMIN) 50 MG tablet; TAKE 1 TABLET BY MOUTH EVERY DAY  - amLODIPine (NORVASC) 10 MG tablet; Take 1 tablet (10 mg) by mouth daily  - TSH with free T4 reflex; Future  - TSH with free T4 reflex    Hyperlipidemia LDL goal <100  Chronic stable problem.  Continue statin therapy.  - Lipid panel reflex to direct LDL Fasting; Future  - Lipid panel reflex to direct LDL Fasting  Patient also reports left hand pain located on the tendon over the left thumb.  I suspect the  tendosynovitis.  He tells me it is improving.  Continue to monitor           BMI:   Estimated body mass index is 34.17 kg/m  as calculated from the following:    Height as of this encounter: 1.803 m (5' 11\").    Weight as of this encounter: 111.1 kg (245 lb).           Return in about 3 months (around 3/16/2022) for Routine preventive, Follow up, in person.    Jam Burton MD  [facility] CHRIS Heath is a 76 year old who presents for the following health issues     History of Present Illness       He eats 0-1 servings of fruits and vegetables daily.He consumes 0 sweetened beverage(s) daily.He exercises with enough effort to increase his heart rate 20 to 29 minutes per day.  He exercises " "with enough effort to increase his heart rate 7 days per week.   He is taking medications regularly.   Hypothyroidism   Currently taking synthroid 137 mcg daily       Hypertension   Blood pressure at goal.  Taking atenolol 50 mg , Norvasc 10 mg       Review of Systems   Constitutional, HEENT, cardiovascular, pulmonary, gi and gu systems are negative, except as otherwise noted.      Objective    /80 (BP Location: Left arm, Patient Position: Sitting, Cuff Size: Adult Large)   Pulse 62   Temp 98  F (36.7  C) (Oral)   Ht 1.803 m (5' 11\")   Wt 111.1 kg (245 lb)   SpO2 99%   BMI 34.17 kg/m    Body mass index is 34.17 kg/m .  Physical Exam  Vitals and nursing note reviewed.   Constitutional:       General: He is not in acute distress.     Appearance: Normal appearance. He is not ill-appearing, toxic-appearing or diaphoretic.   HENT:      Head: Normocephalic and atraumatic.   Cardiovascular:      Rate and Rhythm: Normal rate and regular rhythm.      Pulses: Normal pulses.      Heart sounds: Normal heart sounds. No murmur heard.  No gallop.    Pulmonary:      Effort: Pulmonary effort is normal. No respiratory distress.      Breath sounds: Normal breath sounds. No stridor.   Skin:     Capillary Refill: Capillary refill takes less than 2 seconds.   Neurological:      Mental Status: He is alert.                        "

## 2021-12-16 ENCOUNTER — OFFICE VISIT (OUTPATIENT)
Dept: FAMILY MEDICINE | Facility: CLINIC | Age: 76
End: 2021-12-16
Payer: MEDICARE

## 2021-12-16 VITALS
WEIGHT: 245 LBS | OXYGEN SATURATION: 99 % | BODY MASS INDEX: 34.3 KG/M2 | HEART RATE: 62 BPM | DIASTOLIC BLOOD PRESSURE: 80 MMHG | HEIGHT: 71 IN | TEMPERATURE: 98 F | SYSTOLIC BLOOD PRESSURE: 139 MMHG

## 2021-12-16 DIAGNOSIS — E03.9 HYPOTHYROIDISM, UNSPECIFIED TYPE: ICD-10-CM

## 2021-12-16 DIAGNOSIS — E78.5 HYPERLIPIDEMIA LDL GOAL <100: ICD-10-CM

## 2021-12-16 DIAGNOSIS — I10 ESSENTIAL HYPERTENSION: Primary | ICD-10-CM

## 2021-12-16 LAB
ANION GAP SERPL CALCULATED.3IONS-SCNC: 5 MMOL/L (ref 3–14)
BUN SERPL-MCNC: 18 MG/DL (ref 7–30)
CALCIUM SERPL-MCNC: 9.4 MG/DL (ref 8.5–10.1)
CHLORIDE BLD-SCNC: 108 MMOL/L (ref 94–109)
CHOLEST SERPL-MCNC: 159 MG/DL
CO2 SERPL-SCNC: 27 MMOL/L (ref 20–32)
CREAT SERPL-MCNC: 0.97 MG/DL (ref 0.66–1.25)
FASTING STATUS PATIENT QL REPORTED: YES
GFR SERPL CREATININE-BSD FRML MDRD: 76 ML/MIN/1.73M2
GLUCOSE BLD-MCNC: 113 MG/DL (ref 70–99)
HDLC SERPL-MCNC: 44 MG/DL
LDLC SERPL CALC-MCNC: 82 MG/DL
NONHDLC SERPL-MCNC: 115 MG/DL
POTASSIUM BLD-SCNC: 3.7 MMOL/L (ref 3.4–5.3)
SODIUM SERPL-SCNC: 140 MMOL/L (ref 133–144)
TRIGL SERPL-MCNC: 167 MG/DL
TSH SERPL DL<=0.005 MIU/L-ACNC: 0.7 MU/L (ref 0.4–4)

## 2021-12-16 PROCEDURE — 80061 LIPID PANEL: CPT | Performed by: FAMILY MEDICINE

## 2021-12-16 PROCEDURE — 80048 BASIC METABOLIC PNL TOTAL CA: CPT | Performed by: FAMILY MEDICINE

## 2021-12-16 PROCEDURE — 99214 OFFICE O/P EST MOD 30 MIN: CPT | Performed by: FAMILY MEDICINE

## 2021-12-16 PROCEDURE — 84443 ASSAY THYROID STIM HORMONE: CPT | Performed by: FAMILY MEDICINE

## 2021-12-16 PROCEDURE — 36415 COLL VENOUS BLD VENIPUNCTURE: CPT | Performed by: FAMILY MEDICINE

## 2021-12-16 RX ORDER — LEVOTHYROXINE SODIUM 137 UG/1
137 TABLET ORAL DAILY
Qty: 90 TABLET | Refills: 1 | Status: SHIPPED | OUTPATIENT
Start: 2021-12-16 | End: 2022-06-09

## 2021-12-16 RX ORDER — AMLODIPINE BESYLATE 10 MG/1
10 TABLET ORAL DAILY
Qty: 90 TABLET | Refills: 1 | Status: SHIPPED | OUTPATIENT
Start: 2021-12-16 | End: 2022-06-27

## 2021-12-16 RX ORDER — ATENOLOL 50 MG/1
TABLET ORAL
Qty: 90 TABLET | Refills: 1 | Status: SHIPPED | OUTPATIENT
Start: 2021-12-16 | End: 2022-06-09

## 2021-12-16 ASSESSMENT — MIFFLIN-ST. JEOR: SCORE: 1863.44

## 2021-12-16 ASSESSMENT — PAIN SCALES - GENERAL: PAINLEVEL: NO PAIN (0)

## 2022-01-16 ENCOUNTER — HEALTH MAINTENANCE LETTER (OUTPATIENT)
Age: 77
End: 2022-01-16

## 2022-02-08 DIAGNOSIS — E03.9 HYPOTHYROIDISM, UNSPECIFIED TYPE: ICD-10-CM

## 2022-02-08 DIAGNOSIS — I10 ESSENTIAL HYPERTENSION: ICD-10-CM

## 2022-02-08 DIAGNOSIS — K21.00 GASTROESOPHAGEAL REFLUX DISEASE WITH ESOPHAGITIS: ICD-10-CM

## 2022-02-08 DIAGNOSIS — E78.00 HIGH CHOLESTEROL: ICD-10-CM

## 2022-02-08 DIAGNOSIS — I10 HYPERTENSION GOAL BP (BLOOD PRESSURE) < 140/90: ICD-10-CM

## 2022-02-08 RX ORDER — ATORVASTATIN CALCIUM 20 MG/1
20 TABLET, FILM COATED ORAL DAILY
Qty: 90 TABLET | Refills: 2 | Status: SHIPPED | OUTPATIENT
Start: 2022-02-08 | End: 2022-12-23

## 2022-02-17 PROBLEM — Z71.89 ADVANCED DIRECTIVES, COUNSELING/DISCUSSION: Status: ACTIVE | Noted: 2017-12-14

## 2022-03-14 ENCOUNTER — OFFICE VISIT (OUTPATIENT)
Dept: FAMILY MEDICINE | Facility: CLINIC | Age: 77
End: 2022-03-14
Payer: MEDICARE

## 2022-03-14 VITALS
SYSTOLIC BLOOD PRESSURE: 120 MMHG | DIASTOLIC BLOOD PRESSURE: 70 MMHG | HEART RATE: 56 BPM | HEIGHT: 71 IN | OXYGEN SATURATION: 97 % | BODY MASS INDEX: 35 KG/M2 | TEMPERATURE: 97.9 F | WEIGHT: 250 LBS

## 2022-03-14 DIAGNOSIS — M16.12 PRIMARY OSTEOARTHRITIS OF LEFT HIP: ICD-10-CM

## 2022-03-14 DIAGNOSIS — Z01.818 PREOP GENERAL PHYSICAL EXAM: Primary | ICD-10-CM

## 2022-03-14 DIAGNOSIS — G47.33 OSA (OBSTRUCTIVE SLEEP APNEA): ICD-10-CM

## 2022-03-14 PROCEDURE — 93000 ELECTROCARDIOGRAM COMPLETE: CPT | Performed by: FAMILY MEDICINE

## 2022-03-14 PROCEDURE — 99214 OFFICE O/P EST MOD 30 MIN: CPT | Performed by: FAMILY MEDICINE

## 2022-03-14 ASSESSMENT — PAIN SCALES - GENERAL: PAINLEVEL: NO PAIN (0)

## 2022-03-14 NOTE — PATIENT INSTRUCTIONS
Preparing for Your Surgery  Getting started  A nurse will call you to review your health history and instructions. They will give you an arrival time based on your scheduled surgery time. Please be ready to share:    Your doctor's clinic name and phone number    Your medical, surgical and anesthesia history    A list of allergies and sensitivities    A list of medicines, including herbal treatments and over-the-counter drugs    Whether the patient has a legal guardian (ask how to send us the papers in advance)  Please tell us if you're pregnant--or if there's any chance you might be pregnant. Some surgeries may injure a fetus (unborn baby), so they require a pregnancy test. Surgeries that are safe for a fetus don't always need a test, and you can choose whether to have one.   If you have a child who's having surgery, please ask for a copy of Preparing for Your Child's Surgery.    Preparing for surgery    Within 30 days of surgery: Have a pre-op exam (sometimes called an H&P, or History and Physical). This can be done at a clinic or pre-operative center.  ? If you're having a , you may not need this exam. Talk to your care team.    At your pre-op exam, talk to your care team about all medicines you take. If you need to stop any medicines before surgery, ask when to start taking them again.  ? We do this for your safety. Many medicines can make you bleed too much during surgery. Some change how well surgery (anesthesia) drugs work.    Call your insurance company to let them know you're having surgery. (If you don't have insurance, call [phone].)    Call your clinic if there's any change in your health. This includes signs of a cold or flu (sore throat, runny nose, cough, rash, fever). It also includes a scrape or scratch near the surgery site.    If you have questions on the day of surgery, call your hospital or surgery center.  COVID testing  You may need to be tested for COVID-19 before having  surgery. If so, your surgical team will give you instructions for scheduling this test, separate from your preoperative history and physical.  Eating and drinking guidelines  For your safety: Unless your surgeon tells you otherwise, follow the guidelines below.    Eat and drink as usual until 8 hours before surgery. After that, no food or milk.    Drink clear liquids until 2 hours before surgery. These are liquids you can see through, like water, Gatorade and Propel Water. You may also have black coffee and tea (no cream or milk).    Nothing by mouth within 2 hours of surgery. This includes gum, candy and breath mints.    If you drink alcohol: Stop drinking it the night before surgery.    If your care team tells you to take medicine on the morning of surgery, it's okay to take it with a sip of water.  Preventing infection    Shower or bathe the night before and morning of your surgery. Follow the instructions your clinic gave you. (If no instructions, use regular soap.)    Don't shave or clip hair near your surgery site. We'll remove the hair if needed.    Don't smoke or vape the morning of surgery. You may chew nicotine gum up to 2 hours before surgery. A nicotine patch is okay.  ? Note: Some surgeries require you to completely quit smoking and nicotine. Check with your surgeon.    Your care team will make every effort to keep you safe from infection. We will:  ? Clean our hands often with soap and water (or an alcohol-based hand rub).  ? Clean the skin at your surgery site with a special soap that kills germs.  ? Give you a special gown to keep you warm. (Cold raises the risk of infection.)  ? Wear special hair covers, masks, gowns and gloves during surgery.  ? Give antibiotic medicine, if prescribed. Not all surgeries need antibiotics.  What to bring on the day of surgery    Photo ID and insurance card    Copy of your health care directive, if you have one    Glasses and hearing aides (bring cases)  ? You can't  wear contacts during surgery    Inhaler and eye drops, if you use them (tell us about these when you arrive)    CPAP machine or breathing device, if you use them    A few personal items, if spending the night    If you have . . .  ? A pacemaker, ICD (cardiac defibrillator) or other implant: Bring the ID card.  ? An implanted stimulator: Bring the remote control.  ? A legal guardian: Bring a copy of the certified (court-stamped) guardianship papers.  Please remove any jewelry, including body piercings. Leave jewelry and other valuables at home.  If you're going home the day of surgery    You must have a responsible adult drive you home. They should stay with you overnight as well.    If you don't have someone to stay with you, and you aren't safe to go home alone, we may keep you overnight. Insurance often won't pay for this.  After surgery  If it's hard to control your pain or you need more pain medicine, please call your surgeon's office.  Questions?   If you have any questions for your care team, list them here: _________________________________________________________________________________________________________________________________________________________________________ ____________________________________ ____________________________________ ____________________________________  For informational purposes only. Not to replace the advice of your health care provider. Copyright   2003, 2019 [facility]. All rights reserved. Clinically reviewed by Cecille Butts MD. [employer] 700526 - REV 07/21.

## 2022-03-14 NOTE — PROGRESS NOTES
[facility]  [address] [facility] [address]  Phone: [phone]  Primary Provider: Sam Burton  Pre-op Performing Provider: SAM BURTON      PREOPERATIVE EVALUATION:  Today's date: 3/14/2022    Mamadou Pena is a 76 year old male who presents for a preoperative evaluation.    Surgical Information:  Surgery/Procedure: Left Hip replacement  Surgery Location: [facility]  Surgeon: Kamlesh Anderson MD   Surgery Date: 4/8/22  Time of Surgery: TBD  Where patient plans to recover: At a rehab center  Fax number for surgical facility: [phone]    Type of Anesthesia Anticipated: to be determined    Assessment & Plan     The proposed surgical procedure is considered INTERMEDIATE risk.    Preop general physical exam  There is no contraindication for the surgery  - EKG 12-lead complete w/read - Clinics    Primary osteoarthritis of left hip      WILBERTO (obstructive sleep apnea)             Risks and Recommendations:  The patient has the following additional risks and recommendations for perioperative complications:   - Consult Hospitalist / IM to assist with post-op medical management  Obstructive Sleep Apnea:   Patient was advised to bring CPAP with him to the hospital    Medication Instructions:  Patient is to take all scheduled medications on the day of surgery    RECOMMENDATION:  APPROVAL GIVEN to proceed with proposed procedure, without further diagnostic evaluation.                      Subjective     HPI related to upcoming procedure:   Patient is here for preop , he is going for left hip replacement for treatment of left hip osteoarthritis.  He reports he is doing very well.  He denies any chest pain, shortness of breath, dizziness, palpitation.       Preop Questions 3/14/2022   1. Have you ever had a heart attack or stroke? No   2. Have you ever had surgery on your heart or blood vessels, such as a stent placement, a coronary artery bypass, or surgery on an artery in your  head, neck, heart, or legs? No   3. Do you have chest pain with activity? No   4. Do you have a history of  heart failure? No   5. Do you currently have a cold, bronchitis or symptoms of other infection? No   6. Do you have a cough, shortness of breath, or wheezing? No   7. Do you or anyone in your family have previous history of blood clots? No   8. Do you or does anyone in your family have a serious bleeding problem such as prolonged bleeding following surgeries or cuts? No   9. Have you ever had problems with anemia or been told to take iron pills? No   10. Have you had any abnormal blood loss such as black, tarry or bloody stools? No   11. Have you ever had a blood transfusion? No   12. Are you willing to have a blood transfusion if it is medically needed before, during, or after your surgery? Yes   13. Have you or any of your relatives ever had problems with anesthesia? No   14. Do you have sleep apnea, excessive snoring or daytime drowsiness? YES - uses CPAP    14a. Do you have a CPAP machine? Yes   15. Do you have any artifical heart valves or other implanted medical devices like a pacemaker, defibrillator, or continuous glucose monitor? No   16. Do you have artificial joints? No   17. Are you allergic to latex? No       Health Care Directive:  Patient does not have a Health Care Directive or Living Will: Patient states has Advance Directive and will bring in a copy to clinic.    Preoperative Review of :   reviewed - no record of controlled substances prescribed.      Status of Chronic Conditions:  HYPERLIPIDEMIA - Patient has a long history of significant Hyperlipidemia requiring medication for treatment with recent good control. Patient reports no problems or side effects with the medication.     HYPERTENSION - Patient has longstanding history of HTN , currently denies any symptoms referable to elevated blood pressure. Specifically denies chest pain, palpitations, dyspnea, orthopnea, PND or peripheral  edema. Blood pressure readings have been in normal range. Current medication regimen is as listed below. Patient denies any side effects of medication.     HYPOTHYROIDISM - Patient has a longstanding history of chronic Hypothyroidism. Patient has been doing well, noting no tremor, insomnia, hair loss or changes in skin texture. Continues to take medications as directed, without adverse reactions or side effects. Last TSH   Lab Results   Component Value Date    TSH 0.70 12/16/2021   .      SLEEP PROBLEM - Patient has a longstanding history of snoring.. Patient was diagnosed with WILBERTO and uses CPAP     Wt Readings from Last 4 Encounters:   03/14/22 113.4 kg (250 lb)   12/16/21 111.1 kg (245 lb)   09/24/21 111.1 kg (245 lb)   06/25/21 111.1 kg (245 lb)       Review of Systems  Constitutional, neuro, ENT, endocrine, pulmonary, cardiac, gastrointestinal, genitourinary, musculoskeletal, integument and psychiatric systems are negative, except as otherwise noted.    Patient Active Problem List    Diagnosis Date Noted     Advanced directives, counseling/discussion 12/14/2017     Priority: Medium     Advance Directive Problem List Overview:   Name Relationship Phone    Primary Health Care Agent            Alternative Health Care Agent          Patient states has Advance Directive and will bring in a copy to clinic. 9/8/2011   Joleen Lucio MA           Primary osteoarthritis of right hand 11/22/2016     Priority: Medium     Gastroesophageal reflux disease with esophagitis 01/20/2016     Priority: Medium     WILBERTO (obstructive sleep apnea) 01/20/2016     Priority: Medium     High cholesterol 05/29/2015     Priority: Medium     DDD (degenerative disc disease), lumbar 12/19/2011     Priority: Medium     Obesity 11/23/2011     Priority: Medium     Hypertension goal BP (blood pressure) < 140/90 11/14/2010     Priority: Medium     CARDIOVASCULAR SCREENING; LDL GOAL LESS THAN 130 10/31/2010     Priority: Medium     Hypothyroid       Priority: Medium     GERD (gastroesophageal reflux disease)      Priority: Medium     ED (erectile dysfunction)      Priority: Medium     Sleep apnea      Priority: Medium      Past Medical History:   Diagnosis Date     Allergic rhinitis      ED (erectile dysfunction)      GERD (gastroesophageal reflux disease)      Hypothyroid      Sleep apnea      Unspecified essential hypertension      Past Surgical History:   Procedure Laterality Date     CARPAL TUNNEL RELEASE RT/LT       COLONOSCOPY       COMBINED ESOPHAGOSCOPY, GASTROSCOPY, DUODENOSCOPY (EGD) WITH CO2 INSUFFLATION N/A 6/26/2018    Procedure: COMBINED ESOPHAGOSCOPY, GASTROSCOPY, DUODENOSCOPY (EGD) WITH CO2 INSUFFLATION;  EGD-GASTROESOPHAGEAL REFLUX DISEASE / DARIUSZ ;  Surgeon: Otilio Brown DO;  Location: [facility] OR     ESOPHAGOSCOPY, GASTROSCOPY, DUODENOSCOPY (EGD), COMBINED N/A 6/26/2018    Procedure: COMBINED ESOPHAGOSCOPY, GASTROSCOPY, DUODENOSCOPY (EGD), BIOPSY SINGLE OR MULTIPLE;;  Surgeon: Otilio Brown DO;  Location: [facility] OR     TONSILLECTOMY  1950     VASECTOMY  1980     ZZC ANESTH,DX ARTHROSCOPIC PROC KNEE JOINT  9/2006    L     Current Outpatient Medications   Medication Sig Dispense Refill     acyclovir (ZOVIRAX) 400 MG tablet Take 1 tablet (400 mg) by mouth daily 90 tablet 3     amLODIPine (NORVASC) 10 MG tablet Take 1 tablet (10 mg) by mouth daily 90 tablet 1     ASPIRIN 325 MG OR TBEC 1 TABLET DAILY       atenolol (TENORMIN) 50 MG tablet TAKE 1 TABLET BY MOUTH EVERY DAY 90 tablet 1     atorvastatin (LIPITOR) 20 MG tablet Take 1 tablet (20 mg) by mouth daily 90 tablet 2     diclofenac (VOLTAREN) 1 % GEL topical gel Apply 4 grams to knees or 2 grams to hands four times daily using enclosed dosing card. 100 g 1     levothyroxine (SYNTHROID/LEVOTHROID) 137 MCG tablet Take 1 tablet (137 mcg) by mouth daily 90 tablet 1     Multiple Vitamins-Minerals (CENTRUM SILVER) per tablet Take 1 tablet by mouth daily       omeprazole  "(PRILOSEC) 20 MG DR capsule Take 1 capsule (20 mg) by mouth daily 90 capsule 2     order for DME Needs CPAP supplies   He continue to use and benefit from the CPAP  780.57C Sleep apnea 1 Units 1     order for DME Equipment being ordered: CPAP mask and supplies 1 Units 1     order for DME Equipment being ordered: CPAP mask and supplies 1 Units 0       No Known Allergies     Social History     Tobacco Use     Smoking status: Former Smoker     Quit date: 1971     Years since quittin.1     Smokeless tobacco: Never Used   Substance Use Topics     Alcohol use: Yes     Alcohol/week: 1.7 - 2.5 standard drinks     Types: 2 - 3 Standard drinks or equivalent per week     Comment:  3-4 drinks a month     Family History   Problem Relation Age of Onset     Hypertension Mother      Heart Disease Father      Hypertension Paternal Grandfather      Asthma Daughter      History   Drug Use No         Objective     BP (!) 150/87 (BP Location: Left arm, Patient Position: Sitting, Cuff Size: Adult Large)   Pulse 56   Temp 97.9  F (36.6  C) (Tympanic)   Ht 1.803 m (5' 11\")   Wt 113.4 kg (250 lb)   SpO2 97%   BMI 34.87 kg/m      Physical Exam    GENERAL APPEARANCE: healthy, alert and no distress     EYES: EOMI,  PERRL     HENT: ear canals and TM's normal and nose and mouth without ulcers or lesions     NECK: no adenopathy, no asymmetry, masses, or scars and thyroid normal to palpation     RESP: lungs clear to auscultation - no rales, rhonchi or wheezes     CV: regular rates and rhythm, normal S1 S2, no S3 or S4 and no murmur, click or rub     ABDOMEN:  soft, nontender, no HSM or masses and bowel sounds normal     MS: extremities normal- no gross deformities noted, no evidence of inflammation in joints, FROM in all extremities.     SKIN: no suspicious lesions or rashes     NEURO: Normal strength and tone, sensory exam grossly normal, mentation intact and speech normal     PSYCH: mentation appears normal. and affect " normal/bright     LYMPHATICS: No cervical adenopathy    Recent Labs   Lab Test 12/16/21  1124 06/25/21  0905    139   POTASSIUM 3.7 4.8   CR 0.97 1.09        Diagnostics:  No labs were ordered during this visit.   EKG: sinus bradycardia, normal axis, normal intervals, no acute ST/T changes c/w ischemia    Revised Cardiac Risk Index (RCRI):  The patient has the following serious cardiovascular risks for perioperative complications:   - No serious cardiac risks = 0 points     RCRI Interpretation: 0 points: Class I (very low risk - 0.4% complication rate)           Signed Electronically by: Jam Burton MD  Copy of this evaluation report is provided to requesting physician.

## 2022-03-14 NOTE — PROGRESS NOTES
Faxed pre op and labs to [facility] @ [phone].Michelle Moy MA/DAYANA

## 2022-03-29 ENCOUNTER — TELEPHONE (OUTPATIENT)
Dept: FAMILY MEDICINE | Facility: CLINIC | Age: 77
End: 2022-03-29
Payer: MEDICARE

## 2022-03-29 DIAGNOSIS — Z01.818 PREOP GENERAL PHYSICAL EXAM: Primary | ICD-10-CM

## 2022-03-29 NOTE — TELEPHONE ENCOUNTER
Rose calling from [facility] need Dr. Hernandez to place orders for Labs for patients surgery date of 04/04/2022.  Pre op already done by Dr. Hernandez.    Labs needed:  CBC and BMP  ASAP please    Call patient to make a lab appointment for these when orders are placed please    Fax results to: # [phone]    Chanel Jacobs     [facility]

## 2022-03-29 NOTE — TELEPHONE ENCOUNTER
I have scheduled an appointment with the patient for 11:15am 3/30/2022 for his lab work.  Inna Garrido

## 2022-03-30 ENCOUNTER — LAB (OUTPATIENT)
Dept: LAB | Facility: CLINIC | Age: 77
End: 2022-03-30
Payer: MEDICARE

## 2022-03-30 DIAGNOSIS — Z01.818 PREOP GENERAL PHYSICAL EXAM: ICD-10-CM

## 2022-03-30 LAB
ANION GAP SERPL CALCULATED.3IONS-SCNC: 7 MMOL/L (ref 3–14)
BASOPHILS # BLD AUTO: 0 10E3/UL (ref 0–0.2)
BASOPHILS NFR BLD AUTO: 1 %
BUN SERPL-MCNC: 17 MG/DL (ref 7–30)
CALCIUM SERPL-MCNC: 9.2 MG/DL (ref 8.5–10.1)
CHLORIDE BLD-SCNC: 107 MMOL/L (ref 94–109)
CO2 SERPL-SCNC: 26 MMOL/L (ref 20–32)
CREAT SERPL-MCNC: 1.14 MG/DL (ref 0.66–1.25)
EOSINOPHIL # BLD AUTO: 0.3 10E3/UL (ref 0–0.7)
EOSINOPHIL NFR BLD AUTO: 5 %
ERYTHROCYTE [DISTWIDTH] IN BLOOD BY AUTOMATED COUNT: 12.2 % (ref 10–15)
GFR SERPL CREATININE-BSD FRML MDRD: 67 ML/MIN/1.73M2
GLUCOSE BLD-MCNC: 126 MG/DL (ref 70–99)
HCT VFR BLD AUTO: 40.4 % (ref 40–53)
HGB BLD-MCNC: 13.6 G/DL (ref 13.3–17.7)
LYMPHOCYTES # BLD AUTO: 1.3 10E3/UL (ref 0.8–5.3)
LYMPHOCYTES NFR BLD AUTO: 24 %
MCH RBC QN AUTO: 32.5 PG (ref 26.5–33)
MCHC RBC AUTO-ENTMCNC: 33.7 G/DL (ref 31.5–36.5)
MCV RBC AUTO: 96 FL (ref 78–100)
MONOCYTES # BLD AUTO: 0.7 10E3/UL (ref 0–1.3)
MONOCYTES NFR BLD AUTO: 14 %
NEUTROPHILS # BLD AUTO: 3.1 10E3/UL (ref 1.6–8.3)
NEUTROPHILS NFR BLD AUTO: 57 %
PLATELET # BLD AUTO: 182 10E3/UL (ref 150–450)
POTASSIUM BLD-SCNC: 4.6 MMOL/L (ref 3.4–5.3)
RBC # BLD AUTO: 4.19 10E6/UL (ref 4.4–5.9)
SODIUM SERPL-SCNC: 140 MMOL/L (ref 133–144)
WBC # BLD AUTO: 5.4 10E3/UL (ref 4–11)

## 2022-03-30 PROCEDURE — 36415 COLL VENOUS BLD VENIPUNCTURE: CPT

## 2022-03-30 PROCEDURE — 80048 BASIC METABOLIC PNL TOTAL CA: CPT

## 2022-03-30 PROCEDURE — 85025 COMPLETE CBC W/AUTO DIFF WBC: CPT

## 2022-03-30 NOTE — TELEPHONE ENCOUNTER
Patient called and states he had a message on his phone this morning regarding his lab appointment today. Writer was not able to locate a message that patient had been called. Appointment is scheduled and orders are placed.     Joaquina Harley RN  [facility]

## 2022-05-23 ENCOUNTER — PATIENT OUTREACH (OUTPATIENT)
Dept: FAMILY MEDICINE | Facility: CLINIC | Age: 77
End: 2022-05-23
Payer: MEDICARE

## 2022-05-23 NOTE — TELEPHONE ENCOUNTER
Patient Quality Outreach    Patient is due for the following:   Physical  - Due after NOW     NEXT STEPS:   Schedule a yearly physical    Type of outreach:    Sent [employer] message.      Questions for provider review:    None     Ciarra Ngo, CMA

## 2022-06-09 DIAGNOSIS — I10 ESSENTIAL HYPERTENSION: ICD-10-CM

## 2022-06-09 DIAGNOSIS — E03.9 HYPOTHYROIDISM, UNSPECIFIED TYPE: ICD-10-CM

## 2022-06-09 RX ORDER — ATENOLOL 50 MG/1
TABLET ORAL
Qty: 90 TABLET | Refills: 0 | Status: SHIPPED | OUTPATIENT
Start: 2022-06-09 | End: 2022-09-01

## 2022-06-09 RX ORDER — LEVOTHYROXINE SODIUM 137 UG/1
137 TABLET ORAL DAILY
Qty: 90 TABLET | Refills: 0 | Status: SHIPPED | OUTPATIENT
Start: 2022-06-09 | End: 2022-09-02

## 2022-06-09 NOTE — TELEPHONE ENCOUNTER
Prescription approved per [facility] Refill Protocol.  APPT NEEDED FOR FURTHER REFILLS  Health Maintenance Due   Topic Date Due     ZOSTER IMMUNIZATION (1 of 2) Never done     Pneumococcal Vaccine: 65+ Years (2 - PCV) 12/14/2018     MEDICARE ANNUAL WELLNESS VISIT  07/16/2020     ANNUAL REVIEW OF HM ORDERS  06/25/2022     FALL RISK ASSESSMENT  06/25/2022     Kady Gabriel RN

## 2022-06-27 DIAGNOSIS — E03.9 HYPOTHYROIDISM, UNSPECIFIED TYPE: ICD-10-CM

## 2022-06-27 DIAGNOSIS — I10 ESSENTIAL HYPERTENSION: ICD-10-CM

## 2022-06-27 RX ORDER — AMLODIPINE BESYLATE 10 MG/1
10 TABLET ORAL DAILY
Qty: 90 TABLET | Refills: 1 | Status: SHIPPED | OUTPATIENT
Start: 2022-06-27 | End: 2022-12-14

## 2022-06-27 NOTE — TELEPHONE ENCOUNTER
Patient has been sent letters from previous refills, appointment needed.   No pending appointment.   Please advise  Kady Gabriel RN

## 2022-08-15 ENCOUNTER — TRANSFERRED RECORDS (OUTPATIENT)
Dept: HEALTH INFORMATION MANAGEMENT | Facility: CLINIC | Age: 77
End: 2022-08-15

## 2022-09-02 DIAGNOSIS — E03.9 HYPOTHYROIDISM, UNSPECIFIED TYPE: ICD-10-CM

## 2022-09-02 RX ORDER — LEVOTHYROXINE SODIUM 137 UG/1
137 TABLET ORAL DAILY
Qty: 90 TABLET | Refills: 0 | Status: SHIPPED | OUTPATIENT
Start: 2022-09-02 | End: 2022-11-25

## 2022-11-25 DIAGNOSIS — I10 ESSENTIAL HYPERTENSION: ICD-10-CM

## 2022-11-25 DIAGNOSIS — E03.9 HYPOTHYROIDISM, UNSPECIFIED TYPE: ICD-10-CM

## 2022-11-25 RX ORDER — ATENOLOL 50 MG/1
TABLET ORAL
Qty: 90 TABLET | Refills: 0 | Status: SHIPPED | OUTPATIENT
Start: 2022-11-25 | End: 2023-02-22

## 2022-12-14 DIAGNOSIS — I10 ESSENTIAL HYPERTENSION: ICD-10-CM

## 2022-12-14 DIAGNOSIS — E03.9 HYPOTHYROIDISM, UNSPECIFIED TYPE: ICD-10-CM

## 2022-12-14 RX ORDER — AMLODIPINE BESYLATE 10 MG/1
10 TABLET ORAL DAILY
Qty: 90 TABLET | Refills: 0 | Status: SHIPPED | OUTPATIENT
Start: 2022-12-14 | End: 2023-03-07

## 2022-12-20 DIAGNOSIS — E78.00 HIGH CHOLESTEROL: ICD-10-CM

## 2022-12-23 RX ORDER — ATORVASTATIN CALCIUM 20 MG/1
20 TABLET, FILM COATED ORAL DAILY
Qty: 90 TABLET | Refills: 2 | Status: SHIPPED | OUTPATIENT
Start: 2022-12-23 | End: 2024-01-22

## 2023-01-08 ENCOUNTER — HEALTH MAINTENANCE LETTER (OUTPATIENT)
Age: 78
End: 2023-01-08

## 2023-01-25 DIAGNOSIS — E03.9 HYPOTHYROIDISM, UNSPECIFIED TYPE: ICD-10-CM

## 2023-01-25 DIAGNOSIS — I10 HYPERTENSION GOAL BP (BLOOD PRESSURE) < 140/90: ICD-10-CM

## 2023-01-25 DIAGNOSIS — I10 ESSENTIAL HYPERTENSION: ICD-10-CM

## 2023-01-25 DIAGNOSIS — K21.00 GASTROESOPHAGEAL REFLUX DISEASE WITH ESOPHAGITIS: ICD-10-CM

## 2023-02-13 ENCOUNTER — PATIENT OUTREACH (OUTPATIENT)
Dept: FAMILY MEDICINE | Facility: CLINIC | Age: 78
End: 2023-02-13
Payer: MEDICARE

## 2023-02-13 NOTE — TELEPHONE ENCOUNTER
Patient Quality Outreach    Patient is due for the following:   Physical Annual Wellness Visit      Topic Date Due     Zoster (Shingles) Vaccine (1 of 2) Never done     Pneumococcal Vaccine (2 - PCV) 12/14/2018     COVID-19 Vaccine (5 - Booster for Pfizer series) 05/26/2022     Flu Vaccine (1) Never done       Next Steps:   Schedule a Annual Wellness Visit    Type of outreach:    Sent [employer] message.      Questions for provider review:    none     Ciarra Ngo, CMA

## 2023-02-20 DIAGNOSIS — E03.9 HYPOTHYROIDISM, UNSPECIFIED TYPE: ICD-10-CM

## 2023-02-20 DIAGNOSIS — I10 ESSENTIAL HYPERTENSION: ICD-10-CM

## 2023-02-22 RX ORDER — LEVOTHYROXINE SODIUM 137 UG/1
137 TABLET ORAL DAILY
Qty: 90 TABLET | Refills: 0 | Status: SHIPPED | OUTPATIENT
Start: 2023-02-22 | End: 2023-05-24

## 2023-02-22 RX ORDER — ATENOLOL 50 MG/1
TABLET ORAL
Qty: 90 TABLET | Refills: 0 | Status: SHIPPED | OUTPATIENT
Start: 2023-02-22 | End: 2023-05-20

## 2023-02-28 ENCOUNTER — TRANSFERRED RECORDS (OUTPATIENT)
Dept: HEALTH INFORMATION MANAGEMENT | Facility: CLINIC | Age: 78
End: 2023-02-28

## 2023-03-07 DIAGNOSIS — E03.9 HYPOTHYROIDISM, UNSPECIFIED TYPE: ICD-10-CM

## 2023-03-07 DIAGNOSIS — I10 ESSENTIAL HYPERTENSION: ICD-10-CM

## 2023-03-07 RX ORDER — AMLODIPINE BESYLATE 10 MG/1
10 TABLET ORAL DAILY
Qty: 90 TABLET | Refills: 0 | Status: SHIPPED | OUTPATIENT
Start: 2023-03-07 | End: 2023-06-02

## 2023-04-21 DIAGNOSIS — I10 HYPERTENSION GOAL BP (BLOOD PRESSURE) < 140/90: ICD-10-CM

## 2023-04-21 DIAGNOSIS — K21.00 GASTROESOPHAGEAL REFLUX DISEASE WITH ESOPHAGITIS: ICD-10-CM

## 2023-04-21 DIAGNOSIS — I10 ESSENTIAL HYPERTENSION: ICD-10-CM

## 2023-04-21 DIAGNOSIS — E03.9 HYPOTHYROIDISM, UNSPECIFIED TYPE: ICD-10-CM

## 2023-04-23 ENCOUNTER — HEALTH MAINTENANCE LETTER (OUTPATIENT)
Age: 78
End: 2023-04-23

## 2023-05-19 DIAGNOSIS — I10 ESSENTIAL HYPERTENSION: ICD-10-CM

## 2023-05-19 DIAGNOSIS — E03.9 HYPOTHYROIDISM, UNSPECIFIED TYPE: ICD-10-CM

## 2023-05-20 RX ORDER — ATENOLOL 50 MG/1
TABLET ORAL
Qty: 90 TABLET | Refills: 0 | Status: SHIPPED | OUTPATIENT
Start: 2023-05-20 | End: 2023-08-13

## 2023-05-23 DIAGNOSIS — E03.9 HYPOTHYROIDISM, UNSPECIFIED TYPE: ICD-10-CM

## 2023-05-24 RX ORDER — LEVOTHYROXINE SODIUM 137 UG/1
137 TABLET ORAL DAILY
Qty: 90 TABLET | Refills: 0 | Status: SHIPPED | OUTPATIENT
Start: 2023-05-24 | End: 2023-08-16

## 2023-06-01 DIAGNOSIS — I10 ESSENTIAL HYPERTENSION: ICD-10-CM

## 2023-06-01 DIAGNOSIS — E03.9 HYPOTHYROIDISM, UNSPECIFIED TYPE: ICD-10-CM

## 2023-06-02 RX ORDER — AMLODIPINE BESYLATE 10 MG/1
10 TABLET ORAL DAILY
Qty: 90 TABLET | Refills: 0 | Status: SHIPPED | OUTPATIENT
Start: 2023-06-02 | End: 2023-10-25

## 2023-06-27 ENCOUNTER — PATIENT OUTREACH (OUTPATIENT)
Dept: FAMILY MEDICINE | Facility: CLINIC | Age: 78
End: 2023-06-27
Payer: MEDICARE

## 2023-06-27 NOTE — TELEPHONE ENCOUNTER
Patient Quality Outreach    Patient is due for the following:   Physical Annual Wellness Visit      Topic Date Due     Zoster (Shingles) Vaccine (1 of 2) Never done     Pneumococcal Vaccine (2 - PCV) 12/14/2018     COVID-19 Vaccine (5 - Pfizer series) 05/26/2022       Next Steps:   Schedule a Annual Wellness Visit    Type of outreach:    Sent [employer] message.      Questions for provider review:    None           Ciarra Ngo, CMA

## 2023-07-19 DIAGNOSIS — I10 ESSENTIAL HYPERTENSION: ICD-10-CM

## 2023-07-19 DIAGNOSIS — E03.9 HYPOTHYROIDISM, UNSPECIFIED TYPE: ICD-10-CM

## 2023-07-19 DIAGNOSIS — K21.00 GASTROESOPHAGEAL REFLUX DISEASE WITH ESOPHAGITIS: ICD-10-CM

## 2023-07-19 DIAGNOSIS — I10 HYPERTENSION GOAL BP (BLOOD PRESSURE) < 140/90: ICD-10-CM

## 2023-08-13 DIAGNOSIS — I10 ESSENTIAL HYPERTENSION: ICD-10-CM

## 2023-08-13 DIAGNOSIS — E03.9 HYPOTHYROIDISM, UNSPECIFIED TYPE: ICD-10-CM

## 2023-08-16 DIAGNOSIS — E03.9 HYPOTHYROIDISM, UNSPECIFIED TYPE: ICD-10-CM

## 2023-08-17 RX ORDER — ATENOLOL 50 MG/1
TABLET ORAL
Qty: 90 TABLET | Refills: 0 | Status: SHIPPED | OUTPATIENT
Start: 2023-08-17 | End: 2023-11-06

## 2023-08-17 RX ORDER — LEVOTHYROXINE SODIUM 137 UG/1
137 TABLET ORAL DAILY
Qty: 90 TABLET | Refills: 0 | Status: SHIPPED | OUTPATIENT
Start: 2023-08-17 | End: 2023-11-06

## 2023-10-23 ENCOUNTER — DOCUMENTATION ONLY (OUTPATIENT)
Dept: FAMILY MEDICINE | Facility: CLINIC | Age: 78
End: 2023-10-23
Payer: MEDICARE

## 2023-10-23 DIAGNOSIS — Z12.5 SCREENING FOR PROSTATE CANCER: ICD-10-CM

## 2023-10-23 DIAGNOSIS — E03.9 HYPOTHYROIDISM, UNSPECIFIED TYPE: Primary | ICD-10-CM

## 2023-10-23 DIAGNOSIS — I10 ESSENTIAL HYPERTENSION: ICD-10-CM

## 2023-10-23 NOTE — PROGRESS NOTES
Mamadou Pena has an upcoming lab appointment:    Future Appointments   Date Time Provider Department Center   10/31/2023  9:45 AM AN [facility]   11/6/2023  2:00 PM Jam Burton MD [facility]       There is no order available. Please review and place either future orders or HMPO (Review of Health Maintenance Protocol Orders), as appropriate.    Health Maintenance Due   Topic    ANNUAL REVIEW OF HM ORDERS     TSH W/FREE T4 REFLEX      Fransisco HOOVERT

## 2023-10-25 DIAGNOSIS — E03.9 HYPOTHYROIDISM, UNSPECIFIED TYPE: ICD-10-CM

## 2023-10-25 DIAGNOSIS — I10 ESSENTIAL HYPERTENSION: ICD-10-CM

## 2023-10-25 RX ORDER — AMLODIPINE BESYLATE 10 MG/1
10 TABLET ORAL DAILY
Qty: 90 TABLET | Refills: 0 | Status: SHIPPED | OUTPATIENT
Start: 2023-10-25 | End: 2023-11-06

## 2023-10-31 ENCOUNTER — LAB (OUTPATIENT)
Dept: LAB | Facility: CLINIC | Age: 78
End: 2023-10-31
Payer: MEDICARE

## 2023-10-31 DIAGNOSIS — I10 ESSENTIAL HYPERTENSION: ICD-10-CM

## 2023-10-31 DIAGNOSIS — Z12.5 SCREENING FOR PROSTATE CANCER: ICD-10-CM

## 2023-10-31 DIAGNOSIS — E03.9 HYPOTHYROIDISM, UNSPECIFIED TYPE: ICD-10-CM

## 2023-10-31 LAB
ALBUMIN SERPL BCG-MCNC: 4.2 G/DL (ref 3.5–5.2)
ALP SERPL-CCNC: 106 U/L (ref 40–129)
ALT SERPL W P-5'-P-CCNC: 28 U/L (ref 0–70)
ANION GAP SERPL CALCULATED.3IONS-SCNC: 7 MMOL/L (ref 7–15)
AST SERPL W P-5'-P-CCNC: 27 U/L (ref 0–45)
BILIRUB SERPL-MCNC: 0.5 MG/DL
BUN SERPL-MCNC: 13.7 MG/DL (ref 8–23)
CALCIUM SERPL-MCNC: 9.2 MG/DL (ref 8.8–10.2)
CHLORIDE SERPL-SCNC: 104 MMOL/L (ref 98–107)
CHOLEST SERPL-MCNC: 134 MG/DL
CREAT SERPL-MCNC: 1.1 MG/DL (ref 0.67–1.17)
DEPRECATED HCO3 PLAS-SCNC: 29 MMOL/L (ref 22–29)
EGFRCR SERPLBLD CKD-EPI 2021: 69 ML/MIN/1.73M2
GLUCOSE SERPL-MCNC: 112 MG/DL (ref 70–99)
HDLC SERPL-MCNC: 36 MG/DL
LDLC SERPL CALC-MCNC: 71 MG/DL
NONHDLC SERPL-MCNC: 98 MG/DL
POTASSIUM SERPL-SCNC: 4.3 MMOL/L (ref 3.4–5.3)
PROT SERPL-MCNC: 7.4 G/DL (ref 6.4–8.3)
PSA SERPL DL<=0.01 NG/ML-MCNC: 1.63 NG/ML (ref 0–6.5)
SODIUM SERPL-SCNC: 140 MMOL/L (ref 135–145)
TRIGL SERPL-MCNC: 134 MG/DL
TSH SERPL DL<=0.005 MIU/L-ACNC: 0.57 UIU/ML (ref 0.3–4.2)

## 2023-10-31 PROCEDURE — 84443 ASSAY THYROID STIM HORMONE: CPT

## 2023-10-31 PROCEDURE — G0103 PSA SCREENING: HCPCS

## 2023-10-31 PROCEDURE — 80053 COMPREHEN METABOLIC PANEL: CPT

## 2023-10-31 PROCEDURE — 36415 COLL VENOUS BLD VENIPUNCTURE: CPT

## 2023-10-31 PROCEDURE — 80061 LIPID PANEL: CPT

## 2023-11-06 ENCOUNTER — OFFICE VISIT (OUTPATIENT)
Dept: FAMILY MEDICINE | Facility: CLINIC | Age: 78
End: 2023-11-06
Payer: MEDICARE

## 2023-11-06 VITALS
HEART RATE: 63 BPM | SYSTOLIC BLOOD PRESSURE: 146 MMHG | RESPIRATION RATE: 16 BRPM | OXYGEN SATURATION: 98 % | TEMPERATURE: 98.4 F | HEIGHT: 71 IN | DIASTOLIC BLOOD PRESSURE: 76 MMHG | BODY MASS INDEX: 33.88 KG/M2 | WEIGHT: 242 LBS

## 2023-11-06 DIAGNOSIS — E03.9 HYPOTHYROIDISM, UNSPECIFIED TYPE: ICD-10-CM

## 2023-11-06 DIAGNOSIS — K21.00 GASTROESOPHAGEAL REFLUX DISEASE WITH ESOPHAGITIS WITHOUT HEMORRHAGE: ICD-10-CM

## 2023-11-06 DIAGNOSIS — I10 ESSENTIAL HYPERTENSION: ICD-10-CM

## 2023-11-06 DIAGNOSIS — Z00.00 ENCOUNTER FOR MEDICARE ANNUAL WELLNESS EXAM: Primary | ICD-10-CM

## 2023-11-06 PROCEDURE — G0439 PPPS, SUBSEQ VISIT: HCPCS | Performed by: FAMILY MEDICINE

## 2023-11-06 PROCEDURE — 99214 OFFICE O/P EST MOD 30 MIN: CPT | Mod: 25 | Performed by: FAMILY MEDICINE

## 2023-11-06 PROCEDURE — 91320 SARSCV2 VAC 30MCG TRS-SUC IM: CPT | Performed by: FAMILY MEDICINE

## 2023-11-06 PROCEDURE — 90480 ADMN SARSCOV2 VAC 1/ONLY CMP: CPT | Performed by: FAMILY MEDICINE

## 2023-11-06 RX ORDER — ATENOLOL 50 MG/1
TABLET ORAL
Qty: 90 TABLET | Refills: 3 | Status: SHIPPED | OUTPATIENT
Start: 2023-11-06

## 2023-11-06 RX ORDER — ASPIRIN 81 MG/1
81 TABLET ORAL DAILY
COMMUNITY

## 2023-11-06 RX ORDER — LEVOTHYROXINE SODIUM 137 UG/1
137 TABLET ORAL DAILY
Qty: 90 TABLET | Refills: 3 | Status: SHIPPED | OUTPATIENT
Start: 2023-11-06

## 2023-11-06 RX ORDER — AMLODIPINE BESYLATE 10 MG/1
10 TABLET ORAL DAILY
Qty: 90 TABLET | Refills: 3 | Status: SHIPPED | OUTPATIENT
Start: 2023-11-06

## 2023-11-06 ASSESSMENT — ENCOUNTER SYMPTOMS
ARTHRALGIAS: 0
DYSURIA: 0
ABDOMINAL PAIN: 0
PALPITATIONS: 0
HEARTBURN: 0
JOINT SWELLING: 0
FREQUENCY: 0
PARESTHESIAS: 0
CONSTIPATION: 0
NAUSEA: 0
SHORTNESS OF BREATH: 0
FEVER: 0
HEADACHES: 0
CHILLS: 0
NERVOUS/ANXIOUS: 0
MYALGIAS: 0
HEMATURIA: 0
DIARRHEA: 0
WEAKNESS: 0
EYE PAIN: 0
HEMATOCHEZIA: 0
DIZZINESS: 0
COUGH: 0
SORE THROAT: 0

## 2023-11-06 ASSESSMENT — PAIN SCALES - GENERAL: PAINLEVEL: NO PAIN (0)

## 2023-11-06 ASSESSMENT — ACTIVITIES OF DAILY LIVING (ADL): CURRENT_FUNCTION: NO ASSISTANCE NEEDED

## 2023-11-06 NOTE — PATIENT INSTRUCTIONS
Patient Education   Personalized Prevention Plan  You are due for the preventive services outlined below.  Your care team is available to assist you in scheduling these services.  If you have already completed any of these items, please share that information with your care team to update in your medical record.  Health Maintenance Due   Topic Date Due     Zoster (Shingles) Vaccine (1 of 2) Never done     LUNG CANCER SCREENING  Never done     RSV VACCINE (Pregnancy & 60+) (1 - 1-dose 60+ series) Never done     Pneumococcal Vaccine (2 - PCV) 12/14/2018     Annual Wellness Visit  07/16/2020     ANNUAL REVIEW OF HM ORDERS  06/25/2022     Discuss Advance Care Planning  12/14/2022     Flu Vaccine (1) Never done     COVID-19 Vaccine (6 - 2023-24 season) 09/01/2023     Diptheria Tetanus Pertussis (DTAP/TDAP/TD) Vaccine (2 - Td or Tdap) 11/13/2023

## 2023-11-06 NOTE — PROGRESS NOTES
"SUBJECTIVE:   Kumar is a 77 year old who presents for Preventive Visit.      11/6/2023     1:55 PM   Additional Questions   Roomed by Ciarra   Accompanied by Self       Are you in the first 12 months of your Medicare coverage?  No    Healthy Habits:     In general, how would you rate your overall health?  Good    Frequency of exercise:  2-3 days/week    Duration of exercise:  15-30 minutes    Do you usually eat at least 4 servings of fruit and vegetables a day, include whole grains    & fiber and avoid regularly eating high fat or \"junk\" foods?  No    Taking medications regularly:  Yes    Medication side effects:  None    Ability to successfully perform activities of daily living:  No assistance needed    Home Safety:  No safety concerns identified    Hearing Impairment:  Difficulty following a conversation in a noisy restaurant or crowded room, find that men's voices are easier to understand than woman's and difficulty understanding soft or whispered speech    In the past 6 months, have you been bothered by leaking of urine?  No    In general, how would you rate your overall mental or emotional health?  Good    Additional concerns today:  No    Wt Readings from Last 4 Encounters:   11/06/23 109.8 kg (242 lb)   03/14/22 113.4 kg (250 lb)   12/16/21 111.1 kg (245 lb)   09/24/21 111.1 kg (245 lb)         Today's PHQ-2 Score:       11/6/2023     1:48 PM   PHQ-2 ( 1999 Pfizer)   Q1: Little interest or pleasure in doing things 0   Q2: Feeling down, depressed or hopeless 0   PHQ-2 Score 0   Q1: Little interest or pleasure in doing things Not at all   Q2: Feeling down, depressed or hopeless Not at all   PHQ-2 Score 0       Preventive -     Immunization History   Administered Date(s) Administered    COVID-19 12+ (2023-24) (Pfizer) 11/06/2023    COVID-19 Bivalent 12+ (Pfizer) 10/03/2022    COVID-19 MONOVALENT 12+ (Pfizer) 03/01/2021, 03/22/2021, 09/28/2021    COVID-19 Monovalent 12+ (Pfizer 2022) 03/31/2022    Pneumococcal " 23 valent 2017    TD,PF 7+ (Tenivac) 2003    TDAP Vaccine (Adacel) 2013     COVID vaccine given   -HIV/Hep C screen: declines      - Colon CA screen: Colonoscopy, age 45-75 every 10 years or FIT every year or Cologuard every 3 years   Done      - Prostate CA screen: Discussed controversy about screening.   Prostate Specific Antigen Screen   Date Value Ref Range Status   10/31/2023 1.63 0.00 - 6.50 ng/mL Final         -lipids screen: done     Diabetes screen: done           Have you ever done Advance Care Planning? (For example, a Health Directive, POLST, or a discussion with a medical provider or your loved ones about your wishes): Yes, advance care planning is on file.       Fall risk  Fallen 2 or more times in the past year?: No  Any fall with injury in the past year?: No    Cognitive Screening   1) Repeat 3 items (Leader, Season, Table)    2) Clock draw: NORMAL  3) 3 item recall: Recalls 3 objects  Results: 3 items recalled: COGNITIVE IMPAIRMENT LESS LIKELY    Mini-CogTM Copyright S Allie. Licensed by the author for use in [facility]; reprinted with permission (soob@[facility]). All rights reserved.      Do you have sleep apnea, excessive snoring or daytime drowsiness? : yes    Reviewed and updated as needed this visit by clinical staff   Tobacco  Allergies  Meds              Reviewed and updated as needed this visit by Provider                 Social History     Tobacco Use    Smoking status: Former     Types: Cigarettes     Quit date: 1971     Years since quittin.7    Smokeless tobacco: Never   Substance Use Topics    Alcohol use: Yes     Alcohol/week: 1.7 - 2.5 standard drinks of alcohol     Types: 2 - 3 Standard drinks or equivalent per week     Comment:  3-4 drinks a month             2023     1:48 PM   Alcohol Use   Prescreen: >3 drinks/day or >7 drinks/week? No     Do you have a current opioid prescription? No  Do you use any other controlled substances or  medications that are not prescribed by a provider? None              Current providers sharing in care for this patient include:   Patient Care Team:  Jam Burton MD as PCP - General (Family Practice)  Jam Burton MD as Assigned PCP    The following health maintenance items are reviewed in Epic and correct as of today:  Health Maintenance   Topic Date Due    ZOSTER IMMUNIZATION (1 of 2) Never done    RSV VACCINE (Pregnancy & 60+) (1 - 1-dose 60+ series) Never done    Pneumococcal Vaccine: 65+ Years (2 - PCV) 12/14/2018    MEDICARE ANNUAL WELLNESS VISIT  07/16/2020    ANNUAL REVIEW OF HM ORDERS  06/25/2022    INFLUENZA VACCINE (1) Never done    DTAP/TDAP/TD IMMUNIZATION (2 - Td or Tdap) 11/13/2023    TSH W/FREE T4 REFLEX  10/31/2024    FALL RISK ASSESSMENT  11/06/2024    LIPID  10/31/2028    ADVANCE CARE PLANNING  11/06/2028    HEPATITIS C SCREENING  Completed    PHQ-2 (once per calendar year)  Completed    COVID-19 Vaccine  Completed    IPV IMMUNIZATION  Aged Out    HPV IMMUNIZATION  Aged Out    MENINGITIS IMMUNIZATION  Aged Out    RSV MONOCLONAL ANTIBODY  Aged Out    COLORECTAL CANCER SCREENING  Discontinued     Labs reviewed in EPIC  BP Readings from Last 3 Encounters:   11/06/23 (!) 146/76   03/14/22 120/70   12/16/21 139/80    Wt Readings from Last 3 Encounters:   11/06/23 109.8 kg (242 lb)   03/14/22 113.4 kg (250 lb)   12/16/21 111.1 kg (245 lb)                  Patient Active Problem List   Diagnosis    Hypothyroid    GERD (gastroesophageal reflux disease)    ED (erectile dysfunction)    Sleep apnea    CARDIOVASCULAR SCREENING; LDL GOAL LESS THAN 130    Hypertension goal BP (blood pressure) < 140/90    Advanced directives, counseling/discussion    Obesity    DDD (degenerative disc disease), lumbar    High cholesterol    Gastroesophageal reflux disease with esophagitis    WILBERTO (obstructive sleep apnea)    Primary osteoarthritis of right hand     Past Surgical History:   Procedure Laterality Date     CARPAL TUNNEL RELEASE RT/LT      COLONOSCOPY      COLONOSCOPY WITH CO2 INSUFFLATION N/A 2021    Procedure: COLONOSCOPY, WITH CO2 INSUFFLATION;  Surgeon: Aashish Phelan MD;  Location: [facility] OR    COMBINED ESOPHAGOSCOPY, GASTROSCOPY, DUODENOSCOPY (EGD) WITH CO2 INSUFFLATION N/A 2018    Procedure: COMBINED ESOPHAGOSCOPY, GASTROSCOPY, DUODENOSCOPY (EGD) WITH CO2 INSUFFLATION;  EGD-GASTROESOPHAGEAL REFLUX DISEASE / DARIUSZ ;  Surgeon: Otilio Brown DO;  Location: [facility] OR    ESOPHAGOSCOPY, GASTROSCOPY, DUODENOSCOPY (EGD), COMBINED N/A 2018    Procedure: COMBINED ESOPHAGOSCOPY, GASTROSCOPY, DUODENOSCOPY (EGD), BIOPSY SINGLE OR MULTIPLE;;  Surgeon: Otilio Brown DO;  Location: [facility] OR    TONSILLECTOMY  1950    VASECTOMY      ZZC ANESTH,DX ARTHROSCOPIC PROC KNEE JOINT  2006    L       Social History     Tobacco Use    Smoking status: Former     Types: Cigarettes     Quit date: 1971     Years since quittin.7    Smokeless tobacco: Never   Substance Use Topics    Alcohol use: Yes     Alcohol/week: 1.7 - 2.5 standard drinks of alcohol     Types: 2 - 3 Standard drinks or equivalent per week     Comment:  3-4 drinks a month     Family History   Problem Relation Age of Onset    Hypertension Mother     Heart Disease Father     Hypertension Paternal Grandfather     Asthma Daughter          Current Outpatient Medications   Medication Sig Dispense Refill    acyclovir (ZOVIRAX) 400 MG tablet Take 1 tablet (400 mg) by mouth daily 90 tablet 3    amLODIPine (NORVASC) 10 MG tablet Take 1 tablet (10 mg) by mouth daily 90 tablet 3    ASPIRIN 325 MG OR TBEC 1 TABLET DAILY      aspirin 81 MG EC tablet Take 81 mg by mouth daily      atenolol (TENORMIN) 50 MG tablet TAKE 1 TABLET BY MOUTH EVERY DAY 90 tablet 3    atorvastatin (LIPITOR) 20 MG tablet Take 1 tablet (20 mg) by mouth daily 90 tablet 2    diclofenac (VOLTAREN) 1 % GEL topical gel Apply 4 grams to knees or 2 grams to  hands four times daily using enclosed dosing card. 100 g 1    levothyroxine (SYNTHROID/LEVOTHROID) 137 MCG tablet Take 1 tablet (137 mcg) by mouth daily 90 tablet 3    Multiple Vitamins-Minerals (CENTRUM SILVER) per tablet Take 1 tablet by mouth daily      omeprazole (PRILOSEC) 20 MG DR capsule Take 1 capsule (20 mg) by mouth daily 90 capsule 3    order for DME Needs CPAP supplies   He continue to use and benefit from the CPAP  780.57C Sleep apnea 1 Units 1    order for DME Equipment being ordered: CPAP mask and supplies 1 Units 1     No Known Allergies  Recent Labs   Lab Test 10/31/23  0939 03/30/22  1128 12/16/21  1124 12/16/21  1124 06/25/21  0905 04/09/20  0945 11/16/17  0919 11/15/16  0956   LDL 71  --   --  82 61 112*   < >  --    HDL 36*  --   --  44 37* 33*   < >  --    TRIG 134  --   --  167* 203* 340*   < >  --    ALT 28  --   --   --   --   --   --  36   CR 1.10 1.14   < > 0.97 1.09 1.35*   < > 1.28*   GFRESTIMATED 69 67   < > 76 66 51*   < > 55*   GFRESTBLACK  --   --   --   --  76 59*   < > 67   POTASSIUM 4.3 4.6   < > 3.7 4.8 4.6   < > 4.7   TSH 0.57  --   --  0.70 0.09* 0.85   < >  --     < > = values in this interval not displayed.              Review of Systems   Constitutional:  Negative for chills and fever.   HENT:  Positive for hearing loss. Negative for congestion, ear pain and sore throat.    Eyes:  Positive for visual disturbance. Negative for pain.   Respiratory:  Negative for cough and shortness of breath.    Cardiovascular:  Negative for chest pain, palpitations and peripheral edema.   Gastrointestinal:  Negative for abdominal pain, constipation, diarrhea, heartburn, hematochezia and nausea.   Genitourinary:  Positive for impotence. Negative for dysuria, frequency, genital sores, hematuria, penile discharge and urgency.   Musculoskeletal:  Negative for arthralgias, joint swelling and myalgias.   Skin:  Negative for rash.   Neurological:  Negative for dizziness, weakness, headaches and  "paresthesias.   Psychiatric/Behavioral:  Negative for mood changes. The patient is not nervous/anxious.          OBJECTIVE:   BP (!) 146/76   Pulse 63   Temp 98.4  F (36.9  C) (Tympanic)   Resp 16   Ht 1.803 m (5' 11\")   Wt 109.8 kg (242 lb)   SpO2 98%   BMI 33.75 kg/m   Estimated body mass index is 33.75 kg/m  as calculated from the following:    Height as of this encounter: 1.803 m (5' 11\").    Weight as of this encounter: 109.8 kg (242 lb).  Physical Exam  GENERAL: healthy, alert and no distress  EYES: Eyes grossly normal to inspection, PERRL and conjunctivae and sclerae normal  HENT: ear canals and TM's normal, nose and mouth without ulcers or lesions  NECK: no adenopathy, no asymmetry, masses, or scars and thyroid normal to palpation  RESP: lungs clear to auscultation - no rales, rhonchi or wheezes  CV: regular rate and rhythm, normal S1 S2, no S3 or S4, no murmur, click or rub, no peripheral edema and peripheral pulses strong  ABDOMEN: soft, nontender, no hepatosplenomegaly, no masses and bowel sounds normal  MS: no gross musculoskeletal defects noted, no edema  SKIN: no suspicious lesions or rashes  NEURO: Normal strength and tone, mentation intact and speech normal  PSYCH: mentation appears normal, affect normal/bright        ASSESSMENT / PLAN:   (Z00.00) Encounter for Medicare annual wellness exam  (primary encounter diagnosis)  Preventive care reviewed and updated.      (E03.9) Hypothyroidism, unspecified type  No current symptoms.  Currently on Synthroid 1 three 7 mcg, doing well.  Continue same treatment, refill provided.  Plan: atenolol (TENORMIN) 50 MG tablet, levothyroxine        (SYNTHROID/LEVOTHROID) 137 MCG tablet,         omeprazole (PRILOSEC) 20 MG DR capsule          (I10) Essential hypertension  Blood pressure is at goal.  Currently on Norvasc 10 mg, and atenolol 50 mg.  Doing well, continue same treatment, refill provided.    Plan: amLODIPine (NORVASC) 10 MG tablet, atenolol         " (TENORMIN) 50 MG tablet, omeprazole (PRILOSEC)         20 MG DR capsule           (K21.00) Gastroesophageal reflux disease with esophagitis without hemorrhage  Stable, doing well on Prozac 20 mg, continue same treatment.  Plan: omeprazole (PRILOSEC) 20 MG DR capsule            Patient has been advised of split billing requirements and indicates understanding: Yes      COUNSELING:  Reviewed preventive health counseling, as reflected in patient instructions       Regular exercise       Healthy diet/nutrition       Immunizations  Vaccinated for: Covid-19            He reports that he quit smoking about 52 years ago. He has never used smokeless tobacco.      Appropriate preventive services were discussed with this patient, including applicable screening as appropriate for fall prevention, nutrition, physical activity, Tobacco-use cessation, weight loss and cognition.  Checklist reviewing preventive services available has been given to the patient.    Reviewed patients plan of care and provided an AVS. The Basic Care Plan (routine screening as documented in Health Maintenance) for Mamadou meets the Care Plan requirement. This Care Plan has been established and reviewed with the Patient.          Jam Burton MD  [facility]    Identified Health Risks:  I have reviewed Opioid Use Disorder and Substance Use Disorder risk factors and made any needed referrals.

## 2024-01-22 DIAGNOSIS — E78.00 HIGH CHOLESTEROL: ICD-10-CM

## 2024-01-22 RX ORDER — ATORVASTATIN CALCIUM 20 MG/1
20 TABLET, FILM COATED ORAL DAILY
Qty: 90 TABLET | Refills: 2 | Status: SHIPPED | OUTPATIENT
Start: 2024-01-22

## 2024-06-17 PROBLEM — Z71.89 ADVANCED DIRECTIVES, COUNSELING/DISCUSSION: Status: RESOLVED | Noted: 2017-12-14 | Resolved: 2024-06-17

## 2024-10-02 ENCOUNTER — PATIENT OUTREACH (OUTPATIENT)
Dept: FAMILY MEDICINE | Facility: CLINIC | Age: 79
End: 2024-10-02
Payer: MEDICARE

## 2024-10-02 NOTE — TELEPHONE ENCOUNTER
Patient Quality Outreach    Patient is due for the following:   Physical Annual Wellness Visit,  - Due after 11/6/24    Next Steps:   Schedule a Annual Wellness Visit    Type of outreach:    Sent [place] message.      Questions for provider review:    None           Ciarra Ngo, CMA

## 2024-10-11 DIAGNOSIS — E78.00 HIGH CHOLESTEROL: ICD-10-CM

## 2024-10-11 RX ORDER — ATORVASTATIN CALCIUM 20 MG/1
20 TABLET, FILM COATED ORAL DAILY
Qty: 90 TABLET | Refills: 0 | Status: SHIPPED | OUTPATIENT
Start: 2024-10-11

## 2024-10-27 DIAGNOSIS — I10 ESSENTIAL HYPERTENSION: ICD-10-CM

## 2024-10-27 DIAGNOSIS — E03.9 HYPOTHYROIDISM, UNSPECIFIED TYPE: ICD-10-CM

## 2024-10-27 DIAGNOSIS — K21.00 GASTROESOPHAGEAL REFLUX DISEASE WITH ESOPHAGITIS WITHOUT HEMORRHAGE: ICD-10-CM

## 2024-10-28 RX ORDER — LEVOTHYROXINE SODIUM 137 UG/1
137 TABLET ORAL DAILY
Qty: 90 TABLET | Refills: 0 | Status: SHIPPED | OUTPATIENT
Start: 2024-10-28

## 2024-10-30 DIAGNOSIS — I10 ESSENTIAL HYPERTENSION: ICD-10-CM

## 2024-10-30 DIAGNOSIS — E03.9 HYPOTHYROIDISM, UNSPECIFIED TYPE: ICD-10-CM

## 2024-10-30 RX ORDER — ATENOLOL 50 MG/1
TABLET ORAL
Qty: 90 TABLET | Refills: 3 | Status: SHIPPED | OUTPATIENT
Start: 2024-10-30

## 2024-11-06 SDOH — HEALTH STABILITY: PHYSICAL HEALTH: ON AVERAGE, HOW MANY MINUTES DO YOU ENGAGE IN EXERCISE AT THIS LEVEL?: 20 MIN

## 2024-11-06 SDOH — HEALTH STABILITY: PHYSICAL HEALTH: ON AVERAGE, HOW MANY DAYS PER WEEK DO YOU ENGAGE IN MODERATE TO STRENUOUS EXERCISE (LIKE A BRISK WALK)?: 7 DAYS

## 2024-11-06 ASSESSMENT — SOCIAL DETERMINANTS OF HEALTH (SDOH): HOW OFTEN DO YOU GET TOGETHER WITH FRIENDS OR RELATIVES?: ONCE A WEEK

## 2024-11-07 ENCOUNTER — OFFICE VISIT (OUTPATIENT)
Dept: FAMILY MEDICINE | Facility: CLINIC | Age: 79
End: 2024-11-07
Payer: MEDICARE

## 2024-11-07 VITALS
HEIGHT: 71 IN | RESPIRATION RATE: 18 BRPM | DIASTOLIC BLOOD PRESSURE: 79 MMHG | OXYGEN SATURATION: 100 % | HEART RATE: 60 BPM | BODY MASS INDEX: 32.53 KG/M2 | WEIGHT: 232.4 LBS | SYSTOLIC BLOOD PRESSURE: 129 MMHG | TEMPERATURE: 97.6 F

## 2024-11-07 DIAGNOSIS — Z00.00 ENCOUNTER FOR MEDICARE ANNUAL WELLNESS EXAM: Primary | ICD-10-CM

## 2024-11-07 DIAGNOSIS — Z12.83 SKIN EXAM, SCREENING FOR CANCER: ICD-10-CM

## 2024-11-07 DIAGNOSIS — E03.9 HYPOTHYROIDISM, UNSPECIFIED TYPE: ICD-10-CM

## 2024-11-07 DIAGNOSIS — E78.00 HIGH CHOLESTEROL: ICD-10-CM

## 2024-11-07 DIAGNOSIS — Z12.5 SCREENING FOR PROSTATE CANCER: ICD-10-CM

## 2024-11-07 DIAGNOSIS — I10 HYPERTENSION GOAL BP (BLOOD PRESSURE) < 140/90: ICD-10-CM

## 2024-11-07 LAB
BASOPHILS # BLD AUTO: 0 10E3/UL (ref 0–0.2)
BASOPHILS NFR BLD AUTO: 0 %
EOSINOPHIL # BLD AUTO: 0.1 10E3/UL (ref 0–0.7)
EOSINOPHIL NFR BLD AUTO: 2 %
ERYTHROCYTE [DISTWIDTH] IN BLOOD BY AUTOMATED COUNT: 12.2 % (ref 10–15)
EST. AVERAGE GLUCOSE BLD GHB EST-MCNC: 114 MG/DL
HBA1C MFR BLD: 5.6 % (ref 0–5.6)
HCT VFR BLD AUTO: 41.2 % (ref 40–53)
HGB BLD-MCNC: 13.8 G/DL (ref 13.3–17.7)
IMM GRANULOCYTES # BLD: 0 10E3/UL
IMM GRANULOCYTES NFR BLD: 0 %
LYMPHOCYTES # BLD AUTO: 1.4 10E3/UL (ref 0.8–5.3)
LYMPHOCYTES NFR BLD AUTO: 22 %
MCH RBC QN AUTO: 32 PG (ref 26.5–33)
MCHC RBC AUTO-ENTMCNC: 33.5 G/DL (ref 31.5–36.5)
MCV RBC AUTO: 96 FL (ref 78–100)
MONOCYTES # BLD AUTO: 0.7 10E3/UL (ref 0–1.3)
MONOCYTES NFR BLD AUTO: 11 %
NEUTROPHILS # BLD AUTO: 4.1 10E3/UL (ref 1.6–8.3)
NEUTROPHILS NFR BLD AUTO: 64 %
PLATELET # BLD AUTO: 166 10E3/UL (ref 150–450)
RBC # BLD AUTO: 4.31 10E6/UL (ref 4.4–5.9)
WBC # BLD AUTO: 6.4 10E3/UL (ref 4–11)

## 2024-11-07 PROCEDURE — 84443 ASSAY THYROID STIM HORMONE: CPT | Performed by: FAMILY MEDICINE

## 2024-11-07 PROCEDURE — 80061 LIPID PANEL: CPT | Performed by: FAMILY MEDICINE

## 2024-11-07 PROCEDURE — G0103 PSA SCREENING: HCPCS | Performed by: FAMILY MEDICINE

## 2024-11-07 PROCEDURE — G0439 PPPS, SUBSEQ VISIT: HCPCS | Performed by: FAMILY MEDICINE

## 2024-11-07 PROCEDURE — 83036 HEMOGLOBIN GLYCOSYLATED A1C: CPT | Performed by: FAMILY MEDICINE

## 2024-11-07 PROCEDURE — 36415 COLL VENOUS BLD VENIPUNCTURE: CPT | Performed by: FAMILY MEDICINE

## 2024-11-07 PROCEDURE — 80053 COMPREHEN METABOLIC PANEL: CPT | Performed by: FAMILY MEDICINE

## 2024-11-07 PROCEDURE — 85025 COMPLETE CBC W/AUTO DIFF WBC: CPT | Performed by: FAMILY MEDICINE

## 2024-11-07 ASSESSMENT — PAIN SCALES - GENERAL: PAINLEVEL_OUTOF10: NO PAIN (0)

## 2024-11-07 NOTE — PROGRESS NOTES
"Preventive Care Visit  [facility]  Jam Burton MD, Family Medicine  Nov 7, 2024      Assessment & Plan     Encounter for Medicare annual wellness exam    Health maintenance screening and immunizations reviewed with the patient.    We discussed healthy lifestyle, nutrition, cardiovascular risk reduction.  - Screen Labs ordered   - Continue great active lifestyle  - Follow up yearly for the annual physical and preventive care.    - CBC with Platelets & Differential; Future  - Comprehensive metabolic panel; Future  - Hemoglobin A1c; Future  - Lipid panel reflex to direct LDL Fasting; Future  - Adult Eye  Referral; Future  - CBC with Platelets & Differential  - Comprehensive metabolic panel  - Hemoglobin A1c  - Lipid panel reflex to direct LDL Fasting    Hypertension goal BP (blood pressure) < 140/90  Patient has longstanding history of HTN , currently denies any symptoms referable to elevated blood pressure. Specifically denies chest pain, palpitations, dyspnea, orthopnea, PND or peripheral edema. Blood pressure readings have been in normal range.   Current  medication regimen : Atenolol Norvasc  Patient denies any side effects of medication.   Continue the same medications  Blood pressure is at acceptable range.    High cholesterol  Chronic stable problem.  Continue Lipitor 20 mg    Hypothyroidism, unspecified type  Chronic stable problem.  No current symptoms of thyroid dysfunction.  Continue Synthroid 1 three 7 mcg, check TSH  - TSH WITH FREE T4 REFLEX; Future  - TSH WITH FREE T4 REFLEX    Screening for prostate cancer    - Prostate Specific Antigen Screen; Future  - Prostate Specific Antigen Screen    Skin exam, screening for cancer    - Adult Dermatology  Referral; Future    BMI  Estimated body mass index is 32.41 kg/m  as calculated from the following:    Height as of this encounter: 1.803 m (5' 11\").    Weight as of this encounter: 105.4 kg (232 lb 6.4 oz).   Weight " management plan: Discussed healthy diet and exercise guidelines    Counseling  Appropriate preventive services were addressed with this patient via screening, questionnaire, or discussion as appropriate for fall prevention, nutrition, physical activity, Tobacco-use cessation, social engagement, weight loss and cognition.  Checklist reviewing preventive services available has been given to the patient.  Reviewed patient's diet, addressing concerns and/or questions.   He is at risk for psychosocial distress and has been provided with information to reduce risk.   The patient was provided with written information regarding signs of hearing loss.       Work on weight loss  Regular exercise    Jennifer Heath is a 78 year old, presenting for the following:  Wellness Visit        11/7/2024    11:32 AM   Additional Questions   Roomed by bob         11/7/2024    11:32 AM   Patient Reported Additional Medications   Patient reports taking the following new medications none           HPI    Preventive -   Immunization History   Administered Date(s) Administered    COVID-19 12+ (Pfizer) 11/06/2023    COVID-19 Bivalent 12+ (Pfizer) 10/03/2022    COVID-19 MONOVALENT 12+ (Pfizer) 03/01/2021, 03/22/2021, 09/28/2021    COVID-19 Monovalent 12+ (Pfizer 2022) 03/31/2022    Pneumococcal 23 valent 12/14/2017    RSV Vaccine (Arexvy) 11/08/2023    TD,PF 7+ (Tenivac) 08/25/2003    TDAP Vaccine (Adacel) 11/13/2013       - Colon CA screen: Colonoscopy, age 45-75 every 10 years or FIT every year or Cologuard every 3 years   Last colonoscopy in 2021  Impression:               - The examined portion of the ileum was normal.                             - Diverticulosis in the sigmoid colon, in the                             descending colon, in the transverse colon and in                             the ascending colon.                             - Non-bleeding internal hemorrhoids.                             - No specimens collected.    Recommendation:           - Discharge patient to home (ambulatory).                             - Patient has a contact number available for                             emergencies. The signs and symptoms of potential                             delayed complications were discussed with the                             patient. Return to normal activities tomorrow.                             Written discharge instructions were provided to the                             patient.                             - Return to primary care physician.                             - The risk/benefit of contining surviellance                             colonscopy program is not well defined and future                             colorectal cancer risk is likely low given no colon                             polyps were noted today. No further screening would                             be reasonable. Diagnostic colonoscopy can be                             considered in the future for evaluation or                             treatment depending on indication.                                                                     - Prostate CA screen: Discussed controversy about screening.   Prostate Specific Antigen Screen   Date Value Ref Range Status   10/31/2023 1.63 0.00 - 6.50 ng/mL Final       -lipids screen: ordered     Diabetes screen: ordered                 Health Care Directive  Patient does not have a Health Care Directive: Discussed advance care planning with patient; however, patient declined at this time.      11/6/2024   General Health   How would you rate your overall physical health? Good   Feel stress (tense, anxious, or unable to sleep) Only a little      (!) STRESS CONCERN      11/6/2024   Nutrition   Diet: Regular (no restrictions)            11/6/2024   Exercise   Days per week of moderate/strenous exercise 7 days   Average minutes spent exercising at this level 20 min            11/6/2024   Social  Factors   Frequency of gathering with friends or relatives Once a week   Worry food won't last until get money to buy more No   Food not last or not have enough money for food? No   Do you have housing? (Housing is defined as stable permanent housing and does not include staying ouside in a car, in a tent, in an abandoned building, in an overnight shelter, or couch-surfing.) Yes   Are you worried about losing your housing? No   Lack of transportation? No   Unable to get utilities (heat,electricity)? No            11/6/2024   Fall Risk   Fallen 2 or more times in the past year? No     No    Trouble with walking or balance? No     No        Patient-reported    Multiple values from one day are sorted in reverse-chronological order          11/6/2024   Activities of Daily Living- Home Safety   Needs help with the following daily activites None of the above   Safety concerns in the home None of the above            11/6/2024   Dental   Dentist two times every year? Yes            11/6/2024   Hearing Screening   Hearing concerns? (!) IT'S HARD TO FOLLOW A CONVERSATION IN A NOISY RESTAURANT OR CROWDED ROOM.            11/6/2024   Driving Risk Screening   Patient/family members have concerns about driving No            11/6/2024   General Alertness/Fatigue Screening   Have you been more tired than usual lately? No            11/6/2024   Urinary Incontinence Screening   Bothered by leaking urine in past 6 months No            11/6/2024   TB Screening   Were you born outside of the [place]? No            Today's PHQ-2 Score:       11/6/2024     5:32 PM   PHQ-2 ( 1999 Pfizer)   Q1: Little interest or pleasure in doing things 0    Q2: Feeling down, depressed or hopeless 0    PHQ-2 Score 0    Q1: Little interest or pleasure in doing things Not at all   Q2: Feeling down, depressed or hopeless Not at all   PHQ-2 Score 0       Patient-reported           11/6/2024   Substance Use   Alcohol more than 3/day or more than 7/wk No   Do you  have a current opioid prescription? No   How severe/bad is pain from 1 to 10? /10   Do you use any other substances recreationally? No        Social History     Tobacco Use    Smoking status: Former     Current packs/day: 0.00     Types: Cigarettes     Start date: 1962     Quit date: 1971     Years since quittin.7    Smokeless tobacco: Never   Vaping Use    Vaping status: Never Used   Substance Use Topics    Alcohol use: Yes     Alcohol/week: 1.7 - 2.5 standard drinks of alcohol     Types: 2 - 3 Standard drinks or equivalent per week     Comment:  3-4 drinks a month    Drug use: No       ASCVD Risk   The 10-year ASCVD risk score (Vinicio CARABALLO, et al., 2019) is: 33.8%    Values used to calculate the score:      Age: 78 years      Sex: Male      Is Non- : No      Diabetic: No      Tobacco smoker: No      Systolic Blood Pressure: 129 mmHg      Is BP treated: Yes      HDL Cholesterol: 36 mg/dL      Total Cholesterol: 134 mg/dL            Reviewed and updated as needed this visit by Provider     Meds                Past Medical History:   Diagnosis Date    Allergic rhinitis     ED (erectile dysfunction)     GERD (gastroesophageal reflux disease)     Hypothyroid     Sleep apnea     Unspecified essential hypertension      Past Surgical History:   Procedure Laterality Date    BACK SURGERY      CARPAL TUNNEL RELEASE RT/LT      COLONOSCOPY      COLONOSCOPY WITH CO2 INSUFFLATION N/A 2021    Procedure: COLONOSCOPY, WITH CO2 INSUFFLATION;  Surgeon: Aashish Phelan MD;  Location:  OR    COMBINED ESOPHAGOSCOPY, GASTROSCOPY, DUODENOSCOPY (EGD) WITH CO2 INSUFFLATION N/A 2018    Procedure: COMBINED ESOPHAGOSCOPY, GASTROSCOPY, DUODENOSCOPY (EGD) WITH CO2 INSUFFLATION;  EGD-GASTROESOPHAGEAL REFLUX DISEASE / DARIUZS ;  Surgeon: Otilio Brown DO;  Location:  OR    ESOPHAGOSCOPY, GASTROSCOPY, DUODENOSCOPY (EGD), COMBINED N/A 2018     Procedure: COMBINED ESOPHAGOSCOPY, GASTROSCOPY, DUODENOSCOPY (EGD), BIOPSY SINGLE OR MULTIPLE;;  Surgeon: Otilio Brown DO;  Location: [facility] OR    SOFT TISSUE SURGERY  2021    TONSILLECTOMY  01/01/1950    VASECTOMY  01/01/1980    ZZC ANESTH,DX ARTHROSCOPIC PROC KNEE JOINT  09/01/2006    L     Lab work is in process  BP Readings from Last 3 Encounters:   11/07/24 129/79   11/06/23 (!) 146/76   03/14/22 120/70    Wt Readings from Last 3 Encounters:   11/07/24 105.4 kg (232 lb 6.4 oz)   11/06/23 109.8 kg (242 lb)   03/14/22 113.4 kg (250 lb)                  Patient Active Problem List   Diagnosis    Hypothyroid    GERD (gastroesophageal reflux disease)    ED (erectile dysfunction)    Sleep apnea    CARDIOVASCULAR SCREENING; LDL GOAL LESS THAN 130    Hypertension goal BP (blood pressure) < 140/90    Obesity    DDD (degenerative disc disease), lumbar    High cholesterol    Gastroesophageal reflux disease with esophagitis    WILBERTO (obstructive sleep apnea)    Primary osteoarthritis of right hand     Past Surgical History:   Procedure Laterality Date    BACK SURGERY  2011    CARPAL TUNNEL RELEASE RT/LT      COLONOSCOPY      COLONOSCOPY WITH CO2 INSUFFLATION N/A 09/30/2021    Procedure: COLONOSCOPY, WITH CO2 INSUFFLATION;  Surgeon: Aashish Phelan MD;  Location:  OR    COMBINED ESOPHAGOSCOPY, GASTROSCOPY, DUODENOSCOPY (EGD) WITH CO2 INSUFFLATION N/A 06/26/2018    Procedure: COMBINED ESOPHAGOSCOPY, GASTROSCOPY, DUODENOSCOPY (EGD) WITH CO2 INSUFFLATION;  EGD-GASTROESOPHAGEAL REFLUX DISEASE / DARIUSZ ;  Surgeon: Otilio Brown DO;  Location: [facility] OR    ESOPHAGOSCOPY, GASTROSCOPY, DUODENOSCOPY (EGD), COMBINED N/A 06/26/2018    Procedure: COMBINED ESOPHAGOSCOPY, GASTROSCOPY, DUODENOSCOPY (EGD), BIOPSY SINGLE OR MULTIPLE;;  Surgeon: Otilio Brown DO;  Location: [facility] OR    SOFT TISSUE SURGERY  2021    TONSILLECTOMY  01/01/1950    VASECTOMY  01/01/1980    ZZC ANESTH,DX  ARTHROSCOPIC PROC KNEE JOINT  2006    L       Social History     Tobacco Use    Smoking status: Former     Current packs/day: 0.00     Types: Cigarettes     Start date: 1962     Quit date: 1971     Years since quittin.7    Smokeless tobacco: Never   Substance Use Topics    Alcohol use: Yes     Alcohol/week: 1.7 - 2.5 standard drinks of alcohol     Types: 2 - 3 Standard drinks or equivalent per week     Comment:  3-4 drinks a month     Family History   Problem Relation Age of Onset    Hypertension Mother     Heart Disease Father     Hypertension Paternal Grandfather     Asthma Daughter          Current Outpatient Medications   Medication Sig Dispense Refill    acyclovir (ZOVIRAX) 400 MG tablet Take 1 tablet (400 mg) by mouth daily 90 tablet 3    amLODIPine (NORVASC) 10 MG tablet Take 1 tablet (10 mg) by mouth daily 90 tablet 3    aspirin 81 MG EC tablet Take 81 mg by mouth daily      atenolol (TENORMIN) 50 MG tablet TAKE 1 TABLET BY MOUTH EVERY DAY 90 tablet 3    atorvastatin (LIPITOR) 20 MG tablet TAKE 1 TABLET BY MOUTH EVERY DAY 90 tablet 0    diclofenac (VOLTAREN) 1 % GEL topical gel Apply 4 grams to knees or 2 grams to hands four times daily using enclosed dosing card. 100 g 1    levothyroxine (SYNTHROID/LEVOTHROID) 137 MCG tablet TAKE 1 TABLET BY MOUTH EVERY DAY 90 tablet 0    Multiple Vitamins-Minerals (CENTRUM SILVER) per tablet Take 1 tablet by mouth daily      omeprazole (PRILOSEC) 20 MG DR capsule TAKE 1 CAPSULE BY MOUTH EVERY DAY 90 capsule 0    order for DME Needs CPAP supplies   He continue to use and benefit from the CPAP  780.57C Sleep apnea 1 Units 1    order for DME Equipment being ordered: CPAP mask and supplies 1 Units 1    ASPIRIN 325 MG OR TBEC 1 TABLET DAILY       No Known Allergies  Recent Labs   Lab Test 10/31/23  0939 22  1128 21  1124 21  1124 21  0905 20  0945 17  0919 11/15/16  0956   LDL 71  --   --  82 61 112*   < >  --    HDL 36*   "--   --  44 37* 33*   < >  --    TRIG 134  --   --  167* 203* 340*   < >  --    ALT 28  --   --   --   --   --   --  36   CR 1.10 1.14   < > 0.97 1.09 1.35*   < > 1.28*   GFRESTIMATED 69 67   < > 76 66 51*   < > 55*   GFRESTBLACK  --   --   --   --  76 59*   < > 67   POTASSIUM 4.3 4.6   < > 3.7 4.8 4.6   < > 4.7   TSH 0.57  --   --  0.70 0.09* 0.85   < >  --     < > = values in this interval not displayed.      Current providers sharing in care for this patient include:  Patient Care Team:  Jam Burton MD as PCP - General (Family Practice)  Jam Burton MD as Assigned PCP    The following health maintenance items are reviewed in Epic and correct as of today:  Health Maintenance   Topic Date Due    ZOSTER IMMUNIZATION (1 of 2) Never done    Pneumococcal Vaccine: 65+ Years (2 of 2 - PCV) 12/14/2018    ANNUAL REVIEW OF HM ORDERS  06/25/2022    DTAP/TDAP/TD IMMUNIZATION (2 - Td or Tdap) 11/13/2023    INFLUENZA VACCINE (1) Never done    COVID-19 Vaccine (7 - 2024-25 season) 09/01/2024    BMP  10/31/2024    LIPID  10/31/2024    TSH W/FREE T4 REFLEX  10/31/2024    MEDICARE ANNUAL WELLNESS VISIT  11/06/2024    FALL RISK ASSESSMENT  11/07/2025    GLUCOSE  10/31/2026    ADVANCE CARE PLANNING  11/06/2028    HEPATITIS C SCREENING  Completed    PHQ-2 (once per calendar year)  Completed    RSV VACCINE  Completed    HPV IMMUNIZATION  Aged Out    MENINGITIS IMMUNIZATION  Aged Out    RSV MONOCLONAL ANTIBODY  Aged Out    COLORECTAL CANCER SCREENING  Discontinued         Review of Systems  Constitutional, HEENT, cardiovascular, pulmonary, gi and gu systems are negative, except as otherwise noted.     Objective    Exam  /79 (BP Location: Right arm, Patient Position: Sitting, Cuff Size: Adult Large)   Pulse 60   Temp 97.6  F (36.4  C) (Tympanic)   Resp 18   Ht 1.803 m (5' 11\")   Wt 105.4 kg (232 lb 6.4 oz)   SpO2 100%   BMI 32.41 kg/m     Estimated body mass index is 32.41 kg/m  as calculated from the following:    " "Height as of this encounter: 1.803 m (5' 11\").    Weight as of this encounter: 105.4 kg (232 lb 6.4 oz).    Physical Exam  GENERAL: alert and no distress  NECK: no adenopathy, no asymmetry, masses, or scars  RESP: lungs clear to auscultation - no rales, rhonchi or wheezes  CV: regular rate and rhythm, normal S1 S2, no S3 or S4, no murmur, click or rub, no peripheral edema  ABDOMEN: soft, nontender, no hepatosplenomegaly, no masses and bowel sounds normal  MS: no gross musculoskeletal defects noted, no edema        11/7/2024   Mini Cog   Clock Draw Score 2 Normal   3 Item Recall 3 objects recalled   Mini Cog Total Score 5                 Signed Electronically by: Jam Burton MD    "

## 2024-11-08 LAB
ALBUMIN SERPL BCG-MCNC: 4.4 G/DL (ref 3.5–5.2)
ALP SERPL-CCNC: 94 U/L (ref 40–150)
ALT SERPL W P-5'-P-CCNC: 30 U/L (ref 0–70)
ANION GAP SERPL CALCULATED.3IONS-SCNC: 12 MMOL/L (ref 7–15)
AST SERPL W P-5'-P-CCNC: 24 U/L (ref 0–45)
BILIRUB SERPL-MCNC: 0.8 MG/DL
BUN SERPL-MCNC: 15.2 MG/DL (ref 8–23)
CALCIUM SERPL-MCNC: 9.3 MG/DL (ref 8.8–10.4)
CHLORIDE SERPL-SCNC: 103 MMOL/L (ref 98–107)
CHOLEST SERPL-MCNC: 169 MG/DL
CREAT SERPL-MCNC: 1.13 MG/DL (ref 0.67–1.17)
EGFRCR SERPLBLD CKD-EPI 2021: 67 ML/MIN/1.73M2
FASTING STATUS PATIENT QL REPORTED: YES
FASTING STATUS PATIENT QL REPORTED: YES
GLUCOSE SERPL-MCNC: 110 MG/DL (ref 70–99)
HCO3 SERPL-SCNC: 26 MMOL/L (ref 22–29)
HDLC SERPL-MCNC: 42 MG/DL
LDLC SERPL CALC-MCNC: 94 MG/DL
NONHDLC SERPL-MCNC: 127 MG/DL
POTASSIUM SERPL-SCNC: 4.9 MMOL/L (ref 3.4–5.3)
PROT SERPL-MCNC: 7.5 G/DL (ref 6.4–8.3)
PSA SERPL DL<=0.01 NG/ML-MCNC: 1.93 NG/ML (ref 0–6.5)
SODIUM SERPL-SCNC: 141 MMOL/L (ref 135–145)
TRIGL SERPL-MCNC: 164 MG/DL
TSH SERPL DL<=0.005 MIU/L-ACNC: 0.39 UIU/ML (ref 0.3–4.2)

## 2025-01-02 DIAGNOSIS — E78.00 HIGH CHOLESTEROL: ICD-10-CM

## 2025-01-02 RX ORDER — ATORVASTATIN CALCIUM 20 MG/1
20 TABLET, FILM COATED ORAL DAILY
Qty: 90 TABLET | Refills: 0 | Status: SHIPPED | OUTPATIENT
Start: 2025-01-02

## 2025-01-05 DIAGNOSIS — I10 ESSENTIAL HYPERTENSION: ICD-10-CM

## 2025-01-06 RX ORDER — AMLODIPINE BESYLATE 10 MG/1
10 TABLET ORAL DAILY
Qty: 90 TABLET | Refills: 2 | Status: SHIPPED | OUTPATIENT
Start: 2025-01-06

## 2025-01-23 DIAGNOSIS — E03.9 HYPOTHYROIDISM, UNSPECIFIED TYPE: ICD-10-CM

## 2025-01-23 RX ORDER — LEVOTHYROXINE SODIUM 137 UG/1
137 TABLET ORAL DAILY
Qty: 90 TABLET | Refills: 0 | Status: SHIPPED | OUTPATIENT
Start: 2025-01-23

## 2025-04-20 DIAGNOSIS — E03.9 HYPOTHYROIDISM, UNSPECIFIED TYPE: ICD-10-CM

## 2025-04-21 RX ORDER — LEVOTHYROXINE SODIUM 137 UG/1
137 TABLET ORAL DAILY
Qty: 90 TABLET | Refills: 1 | Status: SHIPPED | OUTPATIENT
Start: 2025-04-21

## 2025-04-24 DIAGNOSIS — E78.00 HIGH CHOLESTEROL: ICD-10-CM

## 2025-04-24 RX ORDER — ATORVASTATIN CALCIUM 20 MG/1
20 TABLET, FILM COATED ORAL DAILY
Qty: 90 TABLET | Refills: 0 | Status: SHIPPED | OUTPATIENT
Start: 2025-04-24

## 2025-07-19 DIAGNOSIS — I10 ESSENTIAL HYPERTENSION: ICD-10-CM

## 2025-07-19 DIAGNOSIS — K21.00 GASTROESOPHAGEAL REFLUX DISEASE WITH ESOPHAGITIS WITHOUT HEMORRHAGE: ICD-10-CM

## 2025-07-19 DIAGNOSIS — E03.9 HYPOTHYROIDISM, UNSPECIFIED TYPE: ICD-10-CM

## 2025-07-20 DIAGNOSIS — E78.00 HIGH CHOLESTEROL: ICD-10-CM

## 2025-07-21 RX ORDER — ATORVASTATIN CALCIUM 20 MG/1
20 TABLET, FILM COATED ORAL DAILY
Qty: 90 TABLET | Refills: 0 | Status: SHIPPED | OUTPATIENT
Start: 2025-07-21

## 2025-07-21 RX ORDER — LEVOTHYROXINE SODIUM 137 UG/1
137 TABLET ORAL DAILY
Qty: 90 TABLET | Refills: 1 | OUTPATIENT
Start: 2025-07-21

## 2025-07-21 RX ORDER — OMEPRAZOLE 20 MG/1
20 CAPSULE, DELAYED RELEASE ORAL DAILY
Qty: 90 CAPSULE | Refills: 2 | OUTPATIENT
Start: 2025-07-21

## (undated) DEVICE — PAD CHUX UNDERPAD 23X24" 7136

## (undated) DEVICE — SOL WATER IRRIG 1000ML BOTTLE 07139-09

## (undated) DEVICE — PREP CHLORAPREP 26ML TINTED ORANGE  260815

## (undated) DEVICE — KIT ENDO FIRST STEP DISINFECTANT 200ML W/POUCH EP-4

## (undated) RX ORDER — FENTANYL CITRATE 50 UG/ML
INJECTION, SOLUTION INTRAMUSCULAR; INTRAVENOUS
Status: DISPENSED
Start: 2021-09-30

## (undated) RX ORDER — FENTANYL CITRATE 50 UG/ML
INJECTION, SOLUTION INTRAMUSCULAR; INTRAVENOUS
Status: DISPENSED
Start: 2018-06-26